# Patient Record
Sex: FEMALE | Race: WHITE | NOT HISPANIC OR LATINO | Employment: OTHER | URBAN - METROPOLITAN AREA
[De-identification: names, ages, dates, MRNs, and addresses within clinical notes are randomized per-mention and may not be internally consistent; named-entity substitution may affect disease eponyms.]

---

## 2024-07-24 ENCOUNTER — TELEPHONE (OUTPATIENT)
Dept: HEALTH INFORMATION MANAGEMENT | Facility: OTHER | Age: 64
End: 2024-07-24

## 2024-07-31 ENCOUNTER — HOSPITAL ENCOUNTER (EMERGENCY)
Facility: MEDICAL CENTER | Age: 64
End: 2024-07-31
Attending: EMERGENCY MEDICINE
Payer: COMMERCIAL

## 2024-07-31 VITALS
HEIGHT: 62 IN | DIASTOLIC BLOOD PRESSURE: 65 MMHG | RESPIRATION RATE: 16 BRPM | OXYGEN SATURATION: 92 % | HEART RATE: 69 BPM | TEMPERATURE: 97.5 F | SYSTOLIC BLOOD PRESSURE: 112 MMHG | BODY MASS INDEX: 31.1 KG/M2 | WEIGHT: 169 LBS

## 2024-07-31 DIAGNOSIS — Z72.0 TOBACCO ABUSE: ICD-10-CM

## 2024-07-31 DIAGNOSIS — F41.0 PANIC ATTACK: ICD-10-CM

## 2024-07-31 PROCEDURE — 99283 EMERGENCY DEPT VISIT LOW MDM: CPT

## 2024-07-31 PROCEDURE — 99406 BEHAV CHNG SMOKING 3-10 MIN: CPT

## 2024-07-31 PROCEDURE — 700102 HCHG RX REV CODE 250 W/ 637 OVERRIDE(OP): Performed by: EMERGENCY MEDICINE

## 2024-07-31 PROCEDURE — A9270 NON-COVERED ITEM OR SERVICE: HCPCS | Performed by: EMERGENCY MEDICINE

## 2024-07-31 RX ORDER — NICOTINE 21 MG/24HR
1 PATCH, TRANSDERMAL 24 HOURS TRANSDERMAL ONCE
Status: DISCONTINUED | OUTPATIENT
Start: 2024-07-31 | End: 2024-07-31 | Stop reason: HOSPADM

## 2024-07-31 RX ORDER — NICOTINE 21 MG/24HR
1 PATCH, TRANSDERMAL 24 HOURS TRANSDERMAL EVERY 24 HOURS
Qty: 21 PATCH | Refills: 0 | Status: SHIPPED | OUTPATIENT
Start: 2024-07-31 | End: 2024-08-21

## 2024-07-31 RX ADMIN — NICOTINE 14 MG: 14 PATCH TRANSDERMAL at 04:24

## 2024-07-31 ASSESSMENT — FIBROSIS 4 INDEX: FIB4 SCORE: 1.20688762076150991

## 2024-07-31 NOTE — ED NOTES
Bedside report received from off going RN: Jose, assumed care of patient.  POC discussed with patient. Call light within reach, all needs addressed at this time.       Fall risk interventions in place: Patient's personal possessions are with in their safe reach and Keep floor surfaces clean and dry (all applicable per [place] Fall risk assessment)   Continuous monitoring: Pulse Ox or Blood Pressure  IVF/IV medications: Not Applicable   Oxygen: Room Air  Bedside sitter: Not Applicable   Isolation: Not Applicable

## 2024-07-31 NOTE — ED PROVIDER NOTES
"ED Provider Note    CHIEF COMPLAINT  Chief Complaint   Patient presents with    Anxiety     BIB from home, reports waking up at 3am due to short of breath, pt h/o of asthma. \"I used my inhaler but it didn't help maybe I am having anxiety right now\". Pt denies pain and short of breath at this time.        EXTERNAL RECORDS REVIEWED  Patient's last encounter was an ED visit just a few days ago on July 28 she was seen for anxiety.  Numerous prior ED encounters.      Last outpatient encounter was in the internal medicine visit.  Patient was seen for lab follow-up    HPI/ROS  LIMITATION TO HISTORY   Select: : None  OUTSIDE HISTORIAN(S):  EMS no interventions in route    Violeta Baez is a 63 y.o. female who presents to the emergency department.  She is unclear, why she called the ambulance she states.  She states he gets confused about all the medications she supposed to take.  She has 3 inhalers and she was concerned that she did something inappropriate with them.  She has no complaints at this time.  When allowed to sit and think about this for a minute, she then remembers, that she would also like to continue her efforts to stop smoking and is requesting a NicoDerm patch as well as the gum which she has received here from this facility in the past.  She is unsure of the dose that she has been on.    PAST MEDICAL HISTORY   has a past medical history of Anxiety, Asthma, CHF (congestive heart failure) (HCC), Hashimoto's disease, Hypertension, Hypothyroid, Methamphetamine abuse (HCC), Psychiatric disorder, TBI (traumatic brain injury) (HCC), and Thyroid cancer (HCC).    SURGICAL HISTORY   has a past surgical history that includes thyroidectomy; lumpectomy (Right); and tendon repair (Right).    FAMILY HISTORY  Family History   Problem Relation Age of Onset    No Known Problems Mother     Lung Cancer Father     No Known Problems Son     No Known Problems Son        SOCIAL HISTORY  Social History     Tobacco Use    " "Smoking status: Every Day     Current packs/day: 1.00     Average packs/day: 1 pack/day for 35.0 years (35.0 ttl pk-yrs)     Types: Cigarettes    Smokeless tobacco: Never   Vaping Use    Vaping status: Every Day    Substances: Nicotine   Substance and Sexual Activity    Alcohol use: Yes     Alcohol/week: 0.6 oz     Types: 1 Shots of liquor per week     Comment: \"1 pint a day\"    Drug use: Yes     Frequency: 2.0 times per week     Comment: marijuana & meth    Sexual activity: Yes       CURRENT MEDICATIONS  Home Medications       Reviewed by Aaron Cash R.N. (Registered Nurse) on 07/31/24 at 0357  Med List Status: Partial     Medication Last Dose Status   albuterol 108 (90 Base) MCG/ACT Aero Soln inhalation aerosol  Active   carvedilol (COREG) 3.125 MG Tab  Active   DULoxetine (CYMBALTA) 60 MG Cap DR Particles delayed-release capsule  Active   levothyroxine (SYNTHROID) 150 MCG Tab  Active   lisinopril (PRINIVIL) 10 MG Tab  Active   lisinopril (PRINIVIL) 20 MG Tab  Active   lisinopril-hydrochlorothiazide (PRINZIDE) 20-25 MG per tablet  Active   nicotine (NICODERM) 14 MG/24HR PATCH 24 HR  Active   nicotine polacrilex ([facility] NICOTINE) 4 MG gum  Active   ondansetron (ZOFRAN ODT) 4 MG TABLET DISPERSIBLE  Active                    ALLERGIES  Allergies   Allergen Reactions    Fluoxetine      Irritability, insomnia       PHYSICAL EXAM  VITAL SIGNS: /65   Pulse 69   Temp 36.4 °C (97.5 °F)   Resp 16   Ht 1.575 m (5' 2\")   Wt 76.7 kg (169 lb)   SpO2 92%   BMI 30.91 kg/m²    Vitals reviewed.  Constitutional: Patient is oriented to person, place, and time. Appears well-developed and well-nourished. Mild distress.  A little anxious but able to be reassured.  Head: Normocephalic and atraumatic.   Eyes: Conjunctivae are normal.  Neck: Normal range of motion.   Cardiovascular: Normal rate, regular rhythm and normal heart sounds.  Pulmonary/Chest: Effort normal and breath sounds normal. No respiratory " distress  Musculoskeletal: No edema  Neurological: No cranial nerve deficits, on passive exam. Normal gait. No focal deficits.   Skin: Skin is warm and dry.   Psychiatric: Patient has a normal mood and affect.     EKG/LABS    RADIOLOGY/PROCEDURES     COURSE & MEDICAL DECISION MAKING    ASSESSMENT, COURSE AND PLAN  Care Narrative:   This is a 63-year-old female.  Brought in by ambulance.  She admits, that due to her traumatic brain injury, she gets confused about taking her medications.  She has reassuring vital signs.  She feels better now.  She is requesting assistance with smoking cessation.  Per review of prior records, prior medications, patient has been prescribed, the 4 mg nicotine gum as well as the 14 mg NicoDerm patch.  Once these are administered, I anticipate discharge to home.    ADDITIONAL PROBLEMS MANAGED    Yasmeen Davis D.O. spent greater than 3 minutes with the patient explaining the importance of smoking cessation.       DISPOSITION AND DISCUSSIONS  I have discussed management of the patient with the following physicians and JOSÉ LUIS's:  None    Discussion of management with other [facility] or appropriate source(s): None     Escalation of care considered, and ultimately not performed: None    Barriers to care at this time, including but not limited to: None.     Decision tools and prescription drugs considered including, but not limited to: None.    FINAL DIAGNOSIS  1. Tobacco abuse    2. Panic attack         Electronically signed by: Yasmeen Davis D.O., 7/31/2024 4:00 AM

## 2024-07-31 NOTE — ED NOTES
Discharge instructions reviewed with patient and signed. They were instructed on how to  prescriptions. They verbalized understanding of follow up instructions. All belongings with patient. They ambulate with a steady gait

## 2024-07-31 NOTE — ED TRIAGE NOTES
"Chief Complaint   Patient presents with    Anxiety     BIB from home, reports waking up at 3am due to short of breath, pt h/o of asthma. \"I used my inhaler but it didn't help maybe I am having anxiety right now\". Pt denies pain and short of breath at this time.      Patient denies alcohol and drug use. Patient has medication for anxiety but not compliant     Pt is alert and oriented x 4, speaking in full sentences, follows commands and responds appropriately to questions.     Respirations are even and unlabored.    /57   Pulse 61   Temp 36.4 °C (97.5 °F) (Temporal)   Resp 18   Ht 1.575 m (5' 2\")   Wt 76.7 kg (169 lb)   SpO2 95%   BMI 30.91 kg/m²     "

## 2024-08-01 ENCOUNTER — HOSPITAL ENCOUNTER (EMERGENCY)
Facility: MEDICAL CENTER | Age: 64
End: 2024-08-01
Attending: STUDENT IN AN ORGANIZED HEALTH CARE EDUCATION/TRAINING PROGRAM
Payer: COMMERCIAL

## 2024-08-01 VITALS
BODY MASS INDEX: 31.28 KG/M2 | DIASTOLIC BLOOD PRESSURE: 84 MMHG | TEMPERATURE: 97 F | OXYGEN SATURATION: 96 % | HEART RATE: 64 BPM | WEIGHT: 170 LBS | HEIGHT: 62 IN | SYSTOLIC BLOOD PRESSURE: 197 MMHG | RESPIRATION RATE: 18 BRPM

## 2024-08-01 DIAGNOSIS — F17.200 NICOTINE DEPENDENCE, UNCOMPLICATED, UNSPECIFIED NICOTINE PRODUCT TYPE: ICD-10-CM

## 2024-08-01 PROCEDURE — 700102 HCHG RX REV CODE 250 W/ 637 OVERRIDE(OP): Performed by: STUDENT IN AN ORGANIZED HEALTH CARE EDUCATION/TRAINING PROGRAM

## 2024-08-01 PROCEDURE — A9270 NON-COVERED ITEM OR SERVICE: HCPCS | Performed by: STUDENT IN AN ORGANIZED HEALTH CARE EDUCATION/TRAINING PROGRAM

## 2024-08-01 PROCEDURE — 99284 EMERGENCY DEPT VISIT MOD MDM: CPT

## 2024-08-01 RX ORDER — NICOTINE 21 MG/24HR
1 PATCH, TRANSDERMAL 24 HOURS TRANSDERMAL ONCE
Status: DISCONTINUED | OUTPATIENT
Start: 2024-08-01 | End: 2024-08-01 | Stop reason: HOSPADM

## 2024-08-01 RX ADMIN — NICOTINE 21 MG: 21 PATCH TRANSDERMAL at 16:29

## 2024-08-01 ASSESSMENT — FIBROSIS 4 INDEX: FIB4 SCORE: 1.20688762076150991
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
## 2024-08-01 NOTE — ED TRIAGE NOTES
"Chief Complaint   Patient presents with    Anxiety     Anxiety related to meth use       Pt BIBA with the above complaint.     BP (!) 180/110   Pulse 98   Temp 36.2 °C (97.1 °F)   Resp 18   Ht 1.575 m (5' 2\")   Wt 77.1 kg (170 lb)   SpO2 96%   BMI 31.09 kg/m²       "

## 2024-08-05 ENCOUNTER — OFFICE VISIT (OUTPATIENT)
Dept: BEHAVIORAL HEALTH | Facility: CLINIC | Age: 64
End: 2024-08-05
Payer: COMMERCIAL

## 2024-08-05 DIAGNOSIS — F43.10 PTSD (POST-TRAUMATIC STRESS DISORDER): ICD-10-CM

## 2024-08-05 DIAGNOSIS — F39 MOOD DISORDER (HCC): ICD-10-CM

## 2024-08-05 DIAGNOSIS — F41.1 GAD (GENERALIZED ANXIETY DISORDER): ICD-10-CM

## 2024-08-05 PROCEDURE — 90792 PSYCH DIAG EVAL W/MED SRVCS: CPT | Performed by: PSYCHIATRY & NEUROLOGY

## 2024-08-05 RX ORDER — DULOXETIN HYDROCHLORIDE 60 MG/1
60 CAPSULE, DELAYED RELEASE ORAL DAILY
Qty: 30 CAPSULE | Refills: 2 | Status: SHIPPED | OUTPATIENT
Start: 2024-08-05 | End: 2024-11-03
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
## 2024-08-09 ENCOUNTER — PATIENT OUTREACH (OUTPATIENT)
Dept: HEALTH INFORMATION MANAGEMENT | Facility: OTHER | Age: 64
End: 2024-08-09
Payer: COMMERCIAL

## 2024-08-12 ENCOUNTER — HOSPITAL ENCOUNTER (EMERGENCY)
Facility: MEDICAL CENTER | Age: 64
End: 2024-08-12
Attending: EMERGENCY MEDICINE
Payer: COMMERCIAL

## 2024-08-12 VITALS
BODY MASS INDEX: 29.44 KG/M2 | HEIGHT: 62 IN | TEMPERATURE: 98 F | OXYGEN SATURATION: 98 % | DIASTOLIC BLOOD PRESSURE: 70 MMHG | SYSTOLIC BLOOD PRESSURE: 99 MMHG | HEART RATE: 65 BPM | RESPIRATION RATE: 17 BRPM | WEIGHT: 160 LBS

## 2024-08-12 DIAGNOSIS — Z71.6 TOBACCO ABUSE COUNSELING: ICD-10-CM

## 2024-08-12 DIAGNOSIS — Z86.79 HISTORY OF HYPERTENSION: ICD-10-CM

## 2024-08-12 DIAGNOSIS — F41.1 ANXIETY REACTION: ICD-10-CM

## 2024-08-12 DIAGNOSIS — Z86.79 HISTORY OF CONGESTIVE HEART FAILURE: ICD-10-CM

## 2024-08-12 DIAGNOSIS — Z87.820 HISTORY OF TRAUMATIC BRAIN INJURY: ICD-10-CM

## 2024-08-12 DIAGNOSIS — Z72.0 TOBACCO ABUSE: ICD-10-CM

## 2024-08-12 PROCEDURE — 99283 EMERGENCY DEPT VISIT LOW MDM: CPT

## 2024-08-12 ASSESSMENT — FIBROSIS 4 INDEX: FIB4 SCORE: 1.20688762076150991

## 2024-08-12 NOTE — ED TRIAGE NOTES
"Chief Complaint   Patient presents with    Other     Pt. BIB EMS for c/o \"I am just really concerned because I wasn't supposed to smoke a cigarette.  I am trying to quit and I have a nicotine patch on and I smoked a cigarette.  I just need to get checked out.\"  Denies any specific complaints.  \"I think that I may have just panicked because I smoked a cigarette and I have asthma so then I did my inhaler and then I panicked.\"      To triage via w/c.  Pt. Speaking in rapid full sentences.  No s/s of acute distress noted.    "

## 2024-08-13 NOTE — ED NOTES
Bedside report received from off going RN: Caryl. Assumed care of patient.  POC discussed with patient. Call light within reach, all needs addressed at this time.       Fall risk interventions in place: Move the patient closer to the nurse's station, Patient's personal possessions are with in their safe reach, Place socks on patient, Keep floor surfaces clean and dry, and Accompanied to restroom   Continuous monitoring: Not Applicable   IVF/IV medications: Not Applicable   Oxygen: Room Air  Bedside sitter: Not Applicable   Isolation: Not Applicable
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
## 2024-08-13 NOTE — DISCHARGE INSTRUCTIONS
Do not use your inhalers any more than 2 puffs every 6 hours and only as needed for wheezing or difficulty breathing.  Cut back on your cigarettes slowly, if you have been smoking 1 pack/day cut down to 1/2 pack/day for a week and then 1/4 pack/day for a week and then stop.  Only take the lisinopril and the carvedilol, stop taking the lisinopril with the hydrochlorothiazide.  Make sure you are taking your Cymbalta and your Synthroid.  Follow-up with your doctor in 1 week for recheck

## 2024-08-15 NOTE — PROGRESS NOTES
Community Health Worker Note    Discussed: CHEMEKA Colin reached out to pt to complete monthly outreach. Pt had difficulty following conversation, repeating same questions and statements numerous times. Pt stated she missed her appt with PCP today 8/15. Pt expressed worry that Dr. Mtz will no longer see her. This CHW told pt she was unsure at this time what the plan is but educated pt how important it is to go to appts and if unable to make it, to cancel instead of no show. This CHW highly encouraged pt to go to [facility] to establish with primary care so she can get all her needs met since it is close to where she stays most the time. CHW had to repeat myself multiple times, pt then seemingly expressed understanding. Pt ended call.     Follow-Up Needs: None at this time.    Plan: CHW closing GCM due to no progress.

## 2024-08-16 DIAGNOSIS — J45.20 MILD INTERMITTENT ASTHMA WITHOUT COMPLICATION: ICD-10-CM

## 2024-08-17 ENCOUNTER — HOSPITAL ENCOUNTER (EMERGENCY)
Facility: MEDICAL CENTER | Age: 64
End: 2024-08-17
Attending: STUDENT IN AN ORGANIZED HEALTH CARE EDUCATION/TRAINING PROGRAM
Payer: COMMERCIAL

## 2024-08-17 ENCOUNTER — HOSPITAL ENCOUNTER (EMERGENCY)
Facility: MEDICAL CENTER | Age: 64
End: 2024-08-18
Attending: EMERGENCY MEDICINE
Payer: COMMERCIAL

## 2024-08-17 ENCOUNTER — HOSPITAL ENCOUNTER (EMERGENCY)
Facility: MEDICAL CENTER | Age: 64
End: 2024-08-17
Attending: EMERGENCY MEDICINE
Payer: COMMERCIAL

## 2024-08-17 VITALS
HEIGHT: 62 IN | RESPIRATION RATE: 15 BRPM | SYSTOLIC BLOOD PRESSURE: 129 MMHG | HEART RATE: 60 BPM | OXYGEN SATURATION: 98 % | TEMPERATURE: 98.3 F | BODY MASS INDEX: 29.44 KG/M2 | DIASTOLIC BLOOD PRESSURE: 64 MMHG | WEIGHT: 160 LBS

## 2024-08-17 VITALS
BODY MASS INDEX: 29.44 KG/M2 | HEIGHT: 62 IN | DIASTOLIC BLOOD PRESSURE: 75 MMHG | RESPIRATION RATE: 20 BRPM | TEMPERATURE: 96.8 F | HEART RATE: 62 BPM | SYSTOLIC BLOOD PRESSURE: 122 MMHG | OXYGEN SATURATION: 96 % | WEIGHT: 160 LBS

## 2024-08-17 DIAGNOSIS — F15.10 METHAMPHETAMINE ABUSE (HCC): ICD-10-CM

## 2024-08-17 DIAGNOSIS — F41.9 ANXIETY: ICD-10-CM

## 2024-08-17 DIAGNOSIS — Z87.820 HISTORY OF TRAUMATIC BRAIN INJURY: ICD-10-CM

## 2024-08-17 LAB
AMPHET UR QL SCN: NEGATIVE
BARBITURATES UR QL SCN: NEGATIVE
BENZODIAZ UR QL SCN: POSITIVE
BZE UR QL SCN: NEGATIVE
CANNABINOIDS UR QL SCN: NEGATIVE
FENTANYL UR QL: NEGATIVE
METHADONE UR QL SCN: NEGATIVE
OPIATES UR QL SCN: NEGATIVE
OXYCODONE UR QL SCN: NEGATIVE
PCP UR QL SCN: NEGATIVE
POC BREATHALIZER: 0 PERCENT (ref 0–0.01)
PROPOXYPH UR QL SCN: NEGATIVE

## 2024-08-17 PROCEDURE — 99284 EMERGENCY DEPT VISIT MOD MDM: CPT

## 2024-08-17 PROCEDURE — 700102 HCHG RX REV CODE 250 W/ 637 OVERRIDE(OP): Performed by: EMERGENCY MEDICINE

## 2024-08-17 PROCEDURE — 700102 HCHG RX REV CODE 250 W/ 637 OVERRIDE(OP): Performed by: STUDENT IN AN ORGANIZED HEALTH CARE EDUCATION/TRAINING PROGRAM

## 2024-08-17 PROCEDURE — A9270 NON-COVERED ITEM OR SERVICE: HCPCS | Performed by: EMERGENCY MEDICINE

## 2024-08-17 PROCEDURE — 80307 DRUG TEST PRSMV CHEM ANLYZR: CPT

## 2024-08-17 PROCEDURE — 302970 POC BREATHALIZER: Performed by: STUDENT IN AN ORGANIZED HEALTH CARE EDUCATION/TRAINING PROGRAM

## 2024-08-17 PROCEDURE — A9270 NON-COVERED ITEM OR SERVICE: HCPCS | Performed by: STUDENT IN AN ORGANIZED HEALTH CARE EDUCATION/TRAINING PROGRAM

## 2024-08-17 RX ORDER — HALOPERIDOL 5 MG/1
5 TABLET ORAL ONCE
Status: COMPLETED | OUTPATIENT
Start: 2024-08-17 | End: 2024-08-17

## 2024-08-17 RX ORDER — HYDROXYZINE HYDROCHLORIDE 25 MG/1
25 TABLET, FILM COATED ORAL ONCE
Status: COMPLETED | OUTPATIENT
Start: 2024-08-17 | End: 2024-08-17

## 2024-08-17 RX ORDER — LORAZEPAM 1 MG/1
1 TABLET ORAL ONCE
Status: COMPLETED | OUTPATIENT
Start: 2024-08-17 | End: 2024-08-17

## 2024-08-17 RX ADMIN — LORAZEPAM 1 MG: 1 TABLET ORAL at 23:10

## 2024-08-17 RX ADMIN — HALOPERIDOL 5 MG: 5 TABLET ORAL at 19:08

## 2024-08-17 RX ADMIN — HYDROXYZINE HYDROCHLORIDE 25 MG: 25 TABLET, FILM COATED ORAL at 19:08

## 2024-08-17 ASSESSMENT — LIFESTYLE VARIABLES
TOTAL SCORE: 0
HAVE PEOPLE ANNOYED YOU BY CRITICIZING YOUR DRINKING: NO
CONSUMPTION TOTAL: INCOMPLETE
HAVE YOU EVER FELT YOU SHOULD CUT DOWN ON YOUR DRINKING: NO
TOTAL SCORE: 0
EVER FELT BAD OR GUILTY ABOUT YOUR DRINKING: NO
TOTAL SCORE: 0
EVER HAD A DRINK FIRST THING IN THE MORNING TO STEADY YOUR NERVES TO GET RID OF A HANGOVER: NO
DO YOU DRINK ALCOHOL: NO

## 2024-08-17 ASSESSMENT — FIBROSIS 4 INDEX
FIB4 SCORE: 1.20688762076150991

## 2024-08-17 NOTE — ED TRIAGE NOTES
"Chief Complaint   Patient presents with    Anxiety     [facility] for the above complaint. Patient rambling, paranoid, and extremely anxious on arrival and uncooperative with triage. Per EMS, patient has been non compliant with her anxiety medication recently and has noticed an increase in her paranoia.    Patient has a history of methamphetamine abuse and smoking but states that she recently quit smoking and is using nicotine patches and gum.    Patient received 2 mg IM Versed by EMS PTA, no other interventions.    /61   Pulse (!) 58   Temp 36.8 °C (98.2 °F) (Temporal)   Resp 16   Ht 1.575 m (5' 2\")   Wt 72.6 kg (160 lb)   SpO2 92%  - RA  "
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
## 2024-08-17 NOTE — ED NOTES
Bedside report received from off going RN/tech: Elle, assumed care of patient.  POC discussed with patient. Call light within reach, all needs addressed at this time.       Fall risk interventions in place: Not Applicable (all applicable per [facility] Fall risk assessment)   Continuous monitoring: Not Applicable   IVF/IV medications: Not Applicable   Oxygen: Room Air  Bedside sitter: Not Applicable   Isolation: Not Applicable

## 2024-08-18 VITALS
OXYGEN SATURATION: 92 % | SYSTOLIC BLOOD PRESSURE: 126 MMHG | TEMPERATURE: 98.2 F | WEIGHT: 160 LBS | BODY MASS INDEX: 29.44 KG/M2 | HEIGHT: 62 IN | DIASTOLIC BLOOD PRESSURE: 60 MMHG | RESPIRATION RATE: 18 BRPM | HEART RATE: 65 BPM

## 2024-08-18 NOTE — ED NOTES
Poc breathalyzer 0.000  Assisted pt to restroom, urine sample obtained and sent to lab  Informed Alert team order for consult.

## 2024-08-18 NOTE — ED NOTES
Lab called they have pt's urine but are currently not able to run the urine at this time. Dr Mckeon aware. Lab will run test when able and post it to the chart. Okayed to dc patient per Dr Mckeon. Vs stable. Pt is taking a taxi home. Pt is A&O x4. Pt reports she feels less anxious. Pt ready for dc.

## 2024-08-18 NOTE — DISCHARGE PLANNING
"ALERT team  note:  63 year old female BIB EMS today with c/o anxiety, which is a noted chronic complaint of pt's, including chronic Methamphetamine and ETOH use; voluntary pt; pt with chronic use of ED services for non-emergent complaints, including today with previous [facility] ED visit earlier today for same complaint of \"anxiety\" and received Versed 2 mg IV by ALBERTO, then Dc'd to self; currently, EtOH negative and UDS pending collection; pt denies recent use of either, which is also chronic for pt; states she has an outpt psychiatrist who prescribes her Cymbalta CR 60 mg PO daily, she cannot remember her psychiatrist's name; no SI, HI, or self-harm ideation noted; she is currently residing at the [facility] and able to meet her basic needs; Writer RN reviewed community CD and MH resources with pt, with written information given, including AA mtgs, [facility], [facility], [facility], [facility], and sober living ([facility], [facility]); pt verbalized understanding; writer RN updated [facility] ERP, Dr. Mckeon; pt to DC to self today after receiving Haldol 5 mg PO and Hydroxyzine 25 mg PO today    [employer] commercial insurance plan  "

## 2024-08-18 NOTE — ED NOTES
Assumed care of patient, patient bedside report received from SIDNEY Faria . Pt AAO X 4 , respirations even and unlabored, on room air . Introduced self as pt RN, POC discussed, call light in reach, Fall risk interventions in place.

## 2024-08-18 NOTE — ED TRIAGE NOTES
Chief Complaint   Patient presents with    Anxiety     Pt BIB [facility] from home for anxiety. Pt is anxious, pt seen earlier for the same. Pt denies SI, HI. Pt was given 5 mg of IM haldol per EMS.

## 2024-08-18 NOTE — ED NOTES
Pt discharged . GCS 15. pt in possession of belongings. Pt provided discharge education and information pertaining to medications and follow up appointments. Pt received copy of discharge instructions and verbalized understanding.     Vitals:    08/18/24 0100   BP: 126/60   Pulse: 65   Resp: 18   Temp: 36.8 °C (98.2 °F)   SpO2: 92%

## 2024-08-18 NOTE — DISCHARGE INSTRUCTIONS
You need to make sure you are taking your medication daily, use your pill organizer.  Return if any problems or worsening

## 2024-08-18 NOTE — ED PROVIDER NOTES
"ED Provider Note    CHIEF COMPLAINT  Chief Complaint   Patient presents with    Anxiety       EXTERNAL RECORDS REVIEWED  Outpatient Notes psychiatry note 8/5/2024 for mood disorder, PTSD, EDUARDO started on duloxetine 60 mg daily    HPI/ROS  LIMITATION TO HISTORY   Select: Behavior  OUTSIDE HISTORIAN(S):  none    Violeta Baez is a 63 y.o. female who presents by EMS apparently for anxiety.  Patient keeps repeating \"I meant to call [phone]\".  She reports that she thinks she missed an appointment.  She cannot tell me what appointment she thinks she missed.  She tells me her doctor is Dr. Lomax.  She does not know when the appointment was that she thinks she missed.  She states that she was informed she is supposed to call [phone] rather than 911 when she is feeling anxious.  I later found out that patient evidently called EMS 5 times today.  They attempted to help her with cooking a meal at her place of living but she continued to be insistent on transport.  She has no acute pain complaints at this time.  She was seen earlier today for a somewhat similar complaint.  She denies fever, denies chest pain, denies abdominal pain.  She reports she recently quit smoking and is feeling poorly from nicotine withdrawal.  Has a history of substance abuse.  He denies SI or HI.  She denies auditory visual donations.    PAST MEDICAL HISTORY   has a past medical history of Anxiety, Asthma, CHF (congestive heart failure) (HCC), Hashimoto's disease, Hypertension, Hypothyroid, Methamphetamine abuse (HCC), Psychiatric disorder, TBI (traumatic brain injury) (HCC), and Thyroid cancer (HCC).    SURGICAL HISTORY   has a past surgical history that includes thyroidectomy; lumpectomy (Right); and tendon repair (Right).    FAMILY HISTORY  Family History   Problem Relation Age of Onset    No Known Problems Mother     Lung Cancer Father     No Known Problems Son     No Known Problems Son        SOCIAL HISTORY  Social History     Tobacco Use    " "Smoking status: Every Day     Current packs/day: 1.00     Average packs/day: 1 pack/day for 35.0 years (35.0 ttl pk-yrs)     Types: Cigarettes    Smokeless tobacco: Never   Vaping Use    Vaping status: Every Day    Substances: Nicotine   Substance and Sexual Activity    Alcohol use: Yes     Alcohol/week: 0.6 oz     Types: 1 Shots of liquor per week     Comment: \"1 pint a day\"    Drug use: Not Currently     Frequency: 2.0 times per week     Comment: marijuana & meth\"I don't do them anymore\"    Sexual activity: Yes       CURRENT MEDICATIONS  Home Medications    **Home medications have not yet been reviewed for this encounter**         ALLERGIES  Allergies   Allergen Reactions    Fluoxetine      Irritability, insomnia       PHYSICAL EXAM  VITAL SIGNS: /75   Pulse 62   Temp 36 °C (96.8 °F) (Temporal)   Resp 20   Ht 1.575 m (5' 2\")   Wt 72.6 kg (160 lb)   SpO2 96%   BMI 29.26 kg/m²    Constitutional: Awake and alert. No acute distress.  Head: NCAT.  HEENT: Normal Conjunctiva. PERRLA.  Neck: Grossly normal range of motion. Airway midline.  Cardiovascular: Normal heart rate, Normal rhythm.  Thorax & Lungs: No respiratory distress. Clear to Auscultation bilaterally.  Abdomen: Normal inspection. Nontender. Nondistended  Skin: No obvious rash.  Back: No tenderness, No CVA tenderness.   Musculoskeletal: No obvious deformity. Moves all extremities Well.  Neurologic: A&Ox3.   Psychiatric: Anxious and circular conversation. Denies SI/HI. Denies AH/VH. Appears reasonably groomed and able to care for herself.    EKG/LABS  Results for orders placed or performed during the hospital encounter of 08/17/24   POC BREATHALIZER   Result Value Ref Range    POC Breathalizer 0.000 0.00 - 0.01 Percent     COURSE & MEDICAL DECISION MAKING    ASSESSMENT, COURSE AND PLAN  Care Narrative:   63-year-old female history of anxiety, substance use disorder in the past here for anxiety and reporting she \"meant to call [phone]\"  Afebrile normal " vitals  On exam she is anxious but in no distress.  She denies SI or HI.  Denies auditory visual hallucinations.  She overall seems dressed appropriately and can care for herself.  She was evaluated earlier today with suspicion of methamphetamine use and she was somnolent.  She seems more alert when reading the documentation from earlier today.  She is not voicing any acute medical complaints.  Given that she is not reporting any complaint or request of treatments, I asked if she has any acute concerns she would like addressed and she declines.  In chart review her psychiatrist is encouraging her to call [phone] as she apparently utilizes the 911 system frequently.  Will have the alert team discussed with her and reiterated the importance of her follow-up appointments.  Medications ordered for anxiety  Alert team assessed patient and provided resources.  Did not feel patient met inpatient criteria or had an acute psychiatric problem.  Patient is discharged to prior living conditions.      ADDITIONAL PROBLEMS MANAGED  none    DISPOSITION AND DISCUSSIONS  I have discussed management of the patient with the following physicians and JOSÉ LUIS's:  none    Discussion of management with other QHP or appropriate source(s): None     Escalation of care considered, and ultimately not performed:Laboratory analysis and acute inpatient care management, however at this time, the patient is most appropriate for outpatient management    Barriers to care at this time, including but not limited to: Patient does not have established PCP, Patient lacks transportation , and Patient lacks financial resources.     Decision tools and prescription drugs considered including, but not limited to:  none .    FINAL DIAGNOSIS  1. Anxiety         Electronically signed by: Eusebio Mckeon D.O., 8/17/2024 6:16 PM

## 2024-08-18 NOTE — ED TRIAGE NOTES
Chief Complaint   Patient presents with    Anxiety   Pt bib ems from [facility] c/o anxiety per report pt was recently d/c here similar complaint.

## 2024-08-19 ENCOUNTER — HOSPITAL ENCOUNTER (EMERGENCY)
Facility: MEDICAL CENTER | Age: 64
End: 2024-08-19
Attending: EMERGENCY MEDICINE
Payer: COMMERCIAL

## 2024-08-19 VITALS
OXYGEN SATURATION: 92 % | SYSTOLIC BLOOD PRESSURE: 137 MMHG | HEART RATE: 77 BPM | BODY MASS INDEX: 29.44 KG/M2 | WEIGHT: 160 LBS | RESPIRATION RATE: 16 BRPM | HEIGHT: 62 IN | DIASTOLIC BLOOD PRESSURE: 87 MMHG | TEMPERATURE: 97.5 F

## 2024-08-19 DIAGNOSIS — F41.9 ANXIETY: ICD-10-CM

## 2024-08-19 PROCEDURE — 99283 EMERGENCY DEPT VISIT LOW MDM: CPT

## 2024-08-19 ASSESSMENT — FIBROSIS 4 INDEX: FIB4 SCORE: 1.20688762076150991

## 2024-08-19 NOTE — ED TRIAGE NOTES
"Chief Complaint   Patient presents with    Anxiety     BIB EMS from home, pt reports anxiety and \"feeling like I am dying\".      /89   Pulse 76   Temp 36.3 °C (97.4 °F) (Temporal)   Resp 18   Ht 1.575 m (5' 2\")   Wt 72.6 kg (160 lb)   SpO2 91%   BMI 29.26 kg/m²     Pt was seen 3 times on 8/17 for anxiety. Chart up for ERP.   "

## 2024-08-19 NOTE — ED PROVIDER NOTES
"ED Provider Note    CHIEF COMPLAINT  Chief Complaint   Patient presents with    Anxiety     BIB EMS from home, pt reports anxiety and \"feeling like I am dying\".            HPI/ROS  LIMITATION TO HISTORY   Select: : None  OUTSIDE HISTORIAN(S):  EMS ambulance medics gave report of the patient condition on scene, condition and route and chief complaint    Violeta Baez is a 63 y.o. female who presents feeling anxious.  She admits to history of meth use but states she has not used it today.  Seen 3 times  2 days ago for anxiety, she returned today stating she felt anxious again.  She denies suicidal ideation.  Initially reported to triage that \"feeling like I am dying\", she denies this at this time.  No chest pain.  No difficulty breathing.  No abdominal pain.  She denies headache.  Review of chart shows frequent visits to the ER for anxiety.  The patient denies new injury    PAST MEDICAL HISTORY   has a past medical history of Anxiety, Asthma, CHF (congestive heart failure) (HCC), Hashimoto's disease, Hypertension, Hypothyroid, Methamphetamine abuse (HCC), Psychiatric disorder, TBI (traumatic brain injury) (HCC), and Thyroid cancer (HCC).    SURGICAL HISTORY   has a past surgical history that includes thyroidectomy; lumpectomy (Right); and tendon repair (Right).    FAMILY HISTORY  Family History   Problem Relation Age of Onset    No Known Problems Mother     Lung Cancer Father     No Known Problems Son     No Known Problems Son        SOCIAL HISTORY  Social History     Tobacco Use    Smoking status: Every Day     Current packs/day: 1.00     Average packs/day: 1 pack/day for 35.0 years (35.0 ttl pk-yrs)     Types: Cigarettes    Smokeless tobacco: Never   Vaping Use    Vaping status: Every Day    Substances: Nicotine   Substance and Sexual Activity    Alcohol use: Yes     Alcohol/week: 0.6 oz     Types: 1 Shots of liquor per week     Comment: \"1 pint a day\"    Drug use: Not Currently     Frequency: 2.0 times per " "week     Comment: marijuana & meth\"I don't do them anymore\"    Sexual activity: Yes       CURRENT MEDICATIONS  Home Medications       Reviewed by Kristy Hamilton R.N. (Registered Nurse) on 08/19/24 at 1520  Med List Status: Partial     Medication Last Dose Status   albuterol 108 (90 Base) MCG/ACT Aero Soln inhalation aerosol  Active   carvedilol (COREG) 3.125 MG Tab  Active   DULoxetine (CYMBALTA) 60 MG Cap DR Particles delayed-release capsule  Active   levothyroxine (SYNTHROID) 150 MCG Tab  Active   lisinopril (PRINIVIL) 10 MG Tab  Active   lisinopril (PRINIVIL) 20 MG Tab  Active   lisinopril-hydrochlorothiazide (PRINZIDE) 20-25 MG per tablet  Active   nicotine (NICODERM) 14 MG/24HR PATCH 24 HR  Active   nicotine polacrilex ([employer] NICOTINE) 4 MG gum  Active   nicotine polacrilex (NICORETTE) 4 MG gum  Active   ondansetron (ZOFRAN ODT) 4 MG TABLET DISPERSIBLE  Active                    ALLERGIES  Allergies   Allergen Reactions    Fluoxetine      Irritability, insomnia       PHYSICAL EXAM  VITAL SIGNS: /89   Pulse 76   Temp 36.3 °C (97.4 °F) (Temporal)   Resp 18   Ht 1.575 m (5' 2\")   Wt 72.6 kg (160 lb)   SpO2 91%   BMI 29.26 kg/m²    Constitutional: Well-nourished no acute distress  Respiratory: Clear lung sounds  Cardiac: Regular rate, regular rhythm  Psychiatric: Mildly anxious.  Cooperative.  Patient denies suicidal or homicidal ideation  Neurologic: Ambulates without assistance.  No ataxia.  Strength normal, speech clear  GI: Abdomen is soft and nontender          COURSE & MEDICAL DECISION MAKING    ASSESSMENT, COURSE AND PLAN  Care Narrative: Patient here initially for anxiety.  Review of her chart shows highly frequent visits to the ER, 2 days ago had 3 visits in the same day.  Her anxiety seems to have calmed.  She is currently perseverating on telling the nurse she does not use methamphetamine anymore.  With normal vital signs, no evidence of sympathomimetic toxidrome at this time.  When asked the " patient how I could help her, she states at this point she feels well and would like to go home.  Patient denies being at risk to herself or others.  Given no other acute complaints, she is discharged in stable condition without further workup.        DISPOSITION AND DISCUSSIONS    Escalation of care considered, and ultimately not performed:Laboratory analysis    Barriers to care at this time, including but not limited to: Patient does not have established PCP.       FINAL DIAGNOSIS  1. Anxiety         Electronically signed by: Andrew Cruz M.D., 8/19/2024 3:33 PM

## 2024-08-20 ENCOUNTER — HOSPITAL ENCOUNTER (OUTPATIENT)
Dept: PULMONOLOGY | Facility: MEDICAL CENTER | Age: 64
End: 2024-08-20
Attending: STUDENT IN AN ORGANIZED HEALTH CARE EDUCATION/TRAINING PROGRAM
Payer: COMMERCIAL

## 2024-08-20 DIAGNOSIS — Z72.0 TOBACCO ABUSE: ICD-10-CM

## 2024-08-20 DIAGNOSIS — J45.20 MILD INTERMITTENT ASTHMA WITHOUT COMPLICATION: ICD-10-CM

## 2024-08-20 PROCEDURE — 94060 EVALUATION OF WHEEZING: CPT

## 2024-08-20 PROCEDURE — 94726 PLETHYSMOGRAPHY LUNG VOLUMES: CPT | Mod: 26 | Performed by: INTERNAL MEDICINE

## 2024-08-20 PROCEDURE — 94729 DIFFUSING CAPACITY: CPT

## 2024-08-20 PROCEDURE — 94060 EVALUATION OF WHEEZING: CPT | Mod: 26 | Performed by: INTERNAL MEDICINE

## 2024-08-20 PROCEDURE — 94729 DIFFUSING CAPACITY: CPT | Mod: 26 | Performed by: INTERNAL MEDICINE

## 2024-08-20 PROCEDURE — 94726 PLETHYSMOGRAPHY LUNG VOLUMES: CPT

## 2024-08-20 RX ORDER — ALBUTEROL SULFATE 90 UG/1
2 AEROSOL, METERED RESPIRATORY (INHALATION) EVERY 6 HOURS PRN
Qty: 8.5 G | Refills: 0 | Status: SHIPPED | OUTPATIENT
Start: 2024-08-20

## 2024-08-20 RX ORDER — ALBUTEROL SULFATE 5 MG/ML
2.5 SOLUTION RESPIRATORY (INHALATION)
Status: DISCONTINUED | OUTPATIENT
Start: 2024-08-20 | End: 2024-08-21 | Stop reason: HOSPADM

## 2024-08-20 RX ADMIN — ALBUTEROL SULFATE 2.5 MG: 5 SOLUTION RESPIRATORY (INHALATION) at 08:58

## 2024-08-21 NOTE — PROCEDURES
DATE OF SERVICE:  08/20/2024     PULMONARY FUNCTION TEST INTERPRETATION     REQUESTING PROVIDER:  Andrei Mtz MD     REASON FOR REQUEST:  Tobacco use, mild intermittent asthma.     INTERPRETATION:  1.  Acceptable and reproducible.  2.  FEV1 1.81 liters (81%), FVC 2.57 liters (90%), ratio 70%.  3.  Flow volume loops concavity of the expiratory loop suggestive of   peripheral air obstruction.  4.  TLC 5.19 liters (109%).  5.  DLCO corrected for hemoglobin 23.26 mL per minute mmHg (125%).     IMPRESSION:  Normal spirometry.  No response to bronchodilator.  Evidence of   air trapping with normal total lung capacity and elevated gas transfer   suggestive of reactive airway disease, but clinically correlate.        ______________________________  MD HEIDY Sheridan/KAY    DD:  08/21/2024 14:19  DT:  08/21/2024 14:37    Job#:  [id number]    CC:Andrei Mtz MD

## 2024-08-22 ENCOUNTER — OFFICE VISIT (OUTPATIENT)
Dept: MEDICAL GROUP | Facility: PHYSICIAN GROUP | Age: 64
End: 2024-08-22
Payer: COMMERCIAL

## 2024-08-22 VITALS
OXYGEN SATURATION: 94 % | HEIGHT: 62 IN | HEART RATE: 65 BPM | SYSTOLIC BLOOD PRESSURE: 120 MMHG | WEIGHT: 164.8 LBS | BODY MASS INDEX: 30.33 KG/M2 | DIASTOLIC BLOOD PRESSURE: 60 MMHG

## 2024-08-22 DIAGNOSIS — F15.11 METHAMPHETAMINE ABUSE IN REMISSION (HCC): ICD-10-CM

## 2024-08-22 DIAGNOSIS — E03.8 HYPOTHYROIDISM DUE TO HASHIMOTO'S THYROIDITIS: ICD-10-CM

## 2024-08-22 DIAGNOSIS — F10.10 ALCOHOL ABUSE: ICD-10-CM

## 2024-08-22 DIAGNOSIS — Z11.3 ROUTINE SCREENING FOR STI (SEXUALLY TRANSMITTED INFECTION): ICD-10-CM

## 2024-08-22 DIAGNOSIS — Z00.00 HEALTHCARE MAINTENANCE: ICD-10-CM

## 2024-08-22 DIAGNOSIS — E03.8 OTHER SPECIFIED HYPOTHYROIDISM: ICD-10-CM

## 2024-08-22 DIAGNOSIS — Z12.4 SCREENING FOR CERVICAL CANCER: ICD-10-CM

## 2024-08-22 DIAGNOSIS — E89.0 POSTOPERATIVE HYPOTHYROIDISM: ICD-10-CM

## 2024-08-22 DIAGNOSIS — I10 PRIMARY HYPERTENSION: ICD-10-CM

## 2024-08-22 DIAGNOSIS — R73.03 PRE-DIABETES: ICD-10-CM

## 2024-08-22 DIAGNOSIS — Z72.0 TOBACCO ABUSE: ICD-10-CM

## 2024-08-22 DIAGNOSIS — F43.10 PTSD (POST-TRAUMATIC STRESS DISORDER): ICD-10-CM

## 2024-08-22 DIAGNOSIS — E06.3 HYPOTHYROIDISM DUE TO HASHIMOTO'S THYROIDITIS: ICD-10-CM

## 2024-08-22 DIAGNOSIS — Z23 NEED FOR VACCINATION: ICD-10-CM

## 2024-08-22 DIAGNOSIS — S06.9X9S TRAUMATIC BRAIN INJURY WITH LOSS OF CONSCIOUSNESS, SEQUELA (HCC): ICD-10-CM

## 2024-08-22 PROCEDURE — 90471 IMMUNIZATION ADMIN: CPT | Performed by: FAMILY MEDICINE

## 2024-08-22 PROCEDURE — 3074F SYST BP LT 130 MM HG: CPT | Performed by: FAMILY MEDICINE

## 2024-08-22 PROCEDURE — 99214 OFFICE O/P EST MOD 30 MIN: CPT | Mod: 25 | Performed by: FAMILY MEDICINE

## 2024-08-22 PROCEDURE — 90632 HEPA VACCINE ADULT IM: CPT | Performed by: FAMILY MEDICINE

## 2024-08-22 PROCEDURE — 3078F DIAST BP <80 MM HG: CPT | Performed by: FAMILY MEDICINE

## 2024-08-22 ASSESSMENT — ENCOUNTER SYMPTOMS
ABDOMINAL PAIN: 0
PALPITATIONS: 0
CHILLS: 0
SHORTNESS OF BREATH: 0
SORE THROAT: 0
FEVER: 0
NAUSEA: 0
COUGH: 0
VOMITING: 0

## 2024-08-22 ASSESSMENT — FIBROSIS 4 INDEX: FIB4 SCORE: 1.20688762076150991

## 2024-08-22 NOTE — PROGRESS NOTES
"Verbal consent was acquired by the patient to use [employer] ambient listening note generation during this visit.     Subjective:     HPI:   History of Present Illness  The patient is a 63-year-old female who presents today to [facility].    She has a history of hypertension, asthma, traumatic brain injury, PTSD, anxiety, nicotine dependence, mood disorder, hypothyroidism, generalized anxiety disorder (EDUARDO), alcohol abuse, obesity, and prediabetes. She is currently on medications including Cymbalta, lisinopril, hydrochlorothiazide, Coreg, Synthroid.    She reports feeling anxious without suicidal ideation, but continually states, \"I feel like I am dying.\" She follows with behavioral health and has been seen recently. She is currently on duloxetine 60 mg without significant side effects. She was recently referred for psychotherapy and has tried it but is unable to participate in group therapy. She does not endorse any suicidal or homicidal ideation.    She has a history of drug abuse, including methamphetamine use, but reports she is not currently using. She has previously attended AA meetings but has started drinking again. She has been trying to quit smoking.    She reports that she is retired and recently moved from [place] due to the high cost of living. She is staying at the [facility] while in [place]. She also mentions that her son has a diagnosis of schizophrenia.    Patient presents today to discuss her medications in detail as she gets easily confused.  Patient is currently on lisinopril hydrochlorothiazide combined pill as well as Synthroid Coreg and Cymbalta.    Patient's biggest concerns are panic attacks and anxiety but does not really appreciate her current psychiatrist.  Patient was recently referred to psychology and does plan to follow-up with them for coping mechanisms.      Patient is due for screening for cervical cancer.  Patient would like to have gynecology referral.    Patient is " "accompanied by a friend who is very helpful.    Review of Systems   Constitutional:  Negative for chills and fever.   HENT:  Negative for congestion and sore throat.    Respiratory:  Negative for cough and shortness of breath.    Cardiovascular:  Negative for chest pain and palpitations.   Gastrointestinal:  Negative for abdominal pain, nausea and vomiting.       Health Maintenance: Completed    Objective:     Exam:  /60 (BP Location: Right arm, Patient Position: Sitting, BP Cuff Size: Adult)   Pulse 65   Ht 1.575 m (5' 2\")   Wt 74.8 kg (164 lb 12.8 oz)   SpO2 94%   BMI 30.14 kg/m²  Body mass index is 30.14 kg/m².    Physical Exam  Vitals reviewed.   Constitutional:       Appearance: Normal appearance.   HENT:      Head: Normocephalic and atraumatic.      Right Ear: External ear normal.      Left Ear: External ear normal.      Nose: Nose normal.   Cardiovascular:      Rate and Rhythm: Normal rate and regular rhythm.      Pulses: Normal pulses.      Heart sounds: Normal heart sounds. No murmur heard.  Pulmonary:      Effort: Pulmonary effort is normal. No respiratory distress.      Breath sounds: Normal breath sounds. No wheezing.   Abdominal:      General: Abdomen is flat.      Palpations: Abdomen is soft.   Skin:     General: Skin is warm and dry.   Neurological:      Mental Status: She is alert and oriented to person, place, and time.   Psychiatric:         Mood and Affect: Mood normal.         Behavior: Behavior normal.     Poor dentition    Difficult to focus  Pressured speech  Increased speed of speech        Results      Assessment & Plan:     1. Hypothyroidism due to Hashimoto's thyroiditis  TSH    TRIIDOTHYRONINE    FREE THYROXINE      2. Screening for cervical cancer  Referral to Gynecology      3. Need for vaccination  Hep A Adult 19+      4. Healthcare maintenance  Referral to Dentistry      5. Pre-diabetes        6. Alcohol abuse        7. Other specified hypothyroidism        8. Tobacco abuse "        9. Methamphetamine abuse in remission (HCC)        10. PTSD (post-traumatic stress disorder)        11. Traumatic brain injury with loss of consciousness, sequela (HCC)        12. Primary hypertension        13. Postoperative hypothyroidism        14. Routine screening for STI (sexually transmitted infection)  HIV AG/AB COMBO ASSAY SCREENING          Assessment & Plan  Establish Care  Annual labs are up-to-date except for recent thyroid studies including TSH T4 and T3 is she is on a high dose of Synthroid which has not been stable for a while.  Most recent lab values were high normal.    2. Hypertension  Recommended continued lisinopril-hydrochlorothiazide 20-25 mg/day  Follow-up in 3 months for recheck    3. Unknown cardiac dx  Patient has upcoming appointment with cardiology on 9-5-2024.  Patient has had significant cardiac workup as she has presented to the ER multiple times.  Patient is currently on Coreg 3.125 twice daily  Heart rate controlled at 65  Echocardiogram from 4/5/2023 demonstrates an ejection fraction of 65% with mild mitral regurgitation, mild tricuspid regurgitation, right ventricular since systolic pressure of 40 mm of hemoglobin    4. Hypothyroidism  TSH T4 and T3 pending  Continue Synthroid 150 mcg every morning    5. Anxiety/panic attacks  Continue to follow with behavioral health psychiatry and psychology  Continue Cymbalta 60 mg daily    6. Alcohol Abuse.  She was advised that no amount of alcohol is safe for her due to her history of alcohol abuse. She was encouraged to continue attending AA meetings and follow up with her behavioral health provider.    7. Nicotine Dependence.  She is trying to quit smoking and was advised to continue using Nicorette gum as needed. She was informed that smoking cessation is important to prevent future lung disease.    8. Health Maintenance.  She was advised to get a Hepatitis A vaccine today and return for the second dose in 6 months. She was also  advised to schedule a Pap smear with a gynecologist.    Return in about 3 months (around 11/22/2024) for F/UP BP.    Please note that this dictation was created using voice recognition software. I have made every reasonable attempt to correct obvious errors, but I expect that there are errors of grammar and possibly content that I did not discover before finalizing the note.    Alicia Kramer MD  Family Medicine and Non - Operative Sports Medicine   [facility]

## 2024-08-30 ENCOUNTER — HOSPITAL ENCOUNTER (EMERGENCY)
Facility: MEDICAL CENTER | Age: 64
End: 2024-08-30
Attending: STUDENT IN AN ORGANIZED HEALTH CARE EDUCATION/TRAINING PROGRAM
Payer: COMMERCIAL

## 2024-08-30 VITALS
SYSTOLIC BLOOD PRESSURE: 148 MMHG | HEART RATE: 60 BPM | WEIGHT: 164.9 LBS | TEMPERATURE: 97.8 F | BODY MASS INDEX: 30.35 KG/M2 | HEIGHT: 62 IN | OXYGEN SATURATION: 98 % | DIASTOLIC BLOOD PRESSURE: 80 MMHG | RESPIRATION RATE: 18 BRPM

## 2024-08-30 DIAGNOSIS — Z71.6 ENCOUNTER FOR TOBACCO USE CESSATION COUNSELING: ICD-10-CM

## 2024-08-30 PROCEDURE — 99283 EMERGENCY DEPT VISIT LOW MDM: CPT

## 2024-08-30 RX ORDER — NICOTINE 21 MG/24HR
1 PATCH, TRANSDERMAL 24 HOURS TRANSDERMAL EVERY 24 HOURS
Qty: 30 PATCH | Refills: 0 | Status: SHIPPED | OUTPATIENT
Start: 2024-08-30

## 2024-08-30 ASSESSMENT — FIBROSIS 4 INDEX: FIB4 SCORE: 1.20688762076150991
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
## 2024-08-30 NOTE — ED TRIAGE NOTES
"Pt BIB [facility] with anxiety. Pt called 911 \"just because\" Pt ambulated into triage. When this RN asked why she came in tonight pt sts \"I don't remember\". Per EMS pt had no actual complaint and is seen here frequently. Pt is A&O and ambulatory  "

## 2024-08-30 NOTE — ED NOTES
Vital signs taken and recorded. Discharge in stable condition ambulatory. Health teachings given to patient  with full understanding of the information given. No personal belongings left.

## 2024-08-30 NOTE — ED NOTES
Taken patient from triage waiting room, ambulatory with steady gait, alert/ oriented x 4.Verified patient identification.  Assumed patient care.   Placed on patient room. Connected to cardiac monitor.   Given the call light and instructed to call for any assistance needed/ or concerns.   Bed on lowest position, side rails up, breaks locked. Awaiting for ERP.

## 2024-09-02 ENCOUNTER — HOSPITAL ENCOUNTER (EMERGENCY)
Facility: MEDICAL CENTER | Age: 64
End: 2024-09-02
Attending: EMERGENCY MEDICINE
Payer: COMMERCIAL

## 2024-09-02 VITALS
DIASTOLIC BLOOD PRESSURE: 72 MMHG | WEIGHT: 165 LBS | BODY MASS INDEX: 30.36 KG/M2 | HEIGHT: 62 IN | OXYGEN SATURATION: 97 % | TEMPERATURE: 98.6 F | RESPIRATION RATE: 18 BRPM | SYSTOLIC BLOOD PRESSURE: 136 MMHG | HEART RATE: 71 BPM

## 2024-09-02 DIAGNOSIS — Z71.1 NO PROBLEM, FEARED COMPLAINT UNFOUNDED: ICD-10-CM

## 2024-09-02 PROCEDURE — 99283 EMERGENCY DEPT VISIT LOW MDM: CPT

## 2024-09-02 ASSESSMENT — FIBROSIS 4 INDEX: FIB4 SCORE: 1.20688762076150991

## 2024-09-03 NOTE — ED NOTES
Bedside report to SIDNEY Casillas. Plan of care discussed with patient. Bed locked and in lowest position. Call light available and within reach. Appropriate fall precautions in place. No distress noted. This RN removed from care.

## 2024-09-03 NOTE — ED NOTES
Patient AAOx4. Discharge education/summary reviewed with patient, questions/concerns addressed with pt. Patient verbalized understanding of education provided. Pt ambulatory out to lobby with a steady gait, NAD noted. Pt discharged home to self care.

## 2024-09-03 NOTE — ED TRIAGE NOTES
"Violeta Sophia Nestor  63 y.o.  female  Chief Complaint   Patient presents with    Anxiety     Pt arrives to triage without a chief complaint, states \"I don't remember, I don't feel good\". After some prompts pt reports she is feeling anxious. Pt YEN DOMINGUEZ from home. .      Patient educated on triage process, to alert staff if any changes in condition.'  "
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
## 2024-09-09 ENCOUNTER — HOSPITAL ENCOUNTER (EMERGENCY)
Facility: MEDICAL CENTER | Age: 64
End: 2024-09-09
Attending: EMERGENCY MEDICINE
Payer: COMMERCIAL

## 2024-09-09 ENCOUNTER — TELEPHONE (OUTPATIENT)
Dept: CARDIOLOGY | Facility: MEDICAL CENTER | Age: 64
End: 2024-09-09
Payer: COMMERCIAL

## 2024-09-09 VITALS
OXYGEN SATURATION: 95 % | DIASTOLIC BLOOD PRESSURE: 76 MMHG | TEMPERATURE: 97.8 F | WEIGHT: 165 LBS | HEIGHT: 62 IN | RESPIRATION RATE: 18 BRPM | SYSTOLIC BLOOD PRESSURE: 143 MMHG | HEART RATE: 60 BPM | BODY MASS INDEX: 30.36 KG/M2

## 2024-09-09 DIAGNOSIS — R51.9 NONINTRACTABLE HEADACHE, UNSPECIFIED CHRONICITY PATTERN, UNSPECIFIED HEADACHE TYPE: ICD-10-CM

## 2024-09-09 DIAGNOSIS — F41.9 ANXIETY: ICD-10-CM

## 2024-09-09 PROCEDURE — 99284 EMERGENCY DEPT VISIT MOD MDM: CPT

## 2024-09-09 PROCEDURE — 700102 HCHG RX REV CODE 250 W/ 637 OVERRIDE(OP): Performed by: EMERGENCY MEDICINE

## 2024-09-09 PROCEDURE — A9270 NON-COVERED ITEM OR SERVICE: HCPCS | Performed by: EMERGENCY MEDICINE

## 2024-09-09 RX ORDER — IBUPROFEN 600 MG/1
600 TABLET, FILM COATED ORAL ONCE
Status: COMPLETED | OUTPATIENT
Start: 2024-09-09 | End: 2024-09-09

## 2024-09-09 RX ORDER — ACETAMINOPHEN 325 MG/1
650 TABLET ORAL ONCE
Status: COMPLETED | OUTPATIENT
Start: 2024-09-09 | End: 2024-09-09

## 2024-09-09 RX ADMIN — IBUPROFEN 600 MG: 600 TABLET, FILM COATED ORAL at 15:00

## 2024-09-09 RX ADMIN — ACETAMINOPHEN 650 MG: 325 TABLET ORAL at 15:00

## 2024-09-09 ASSESSMENT — PAIN DESCRIPTION - PAIN TYPE: TYPE: ACUTE PAIN

## 2024-09-09 ASSESSMENT — FIBROSIS 4 INDEX: FIB4 SCORE: 1.20688762076150991

## 2024-09-09 NOTE — ED PROVIDER NOTES
ED Provider Note    CHIEF COMPLAINT  Chief Complaint   Patient presents with    Anxiety    Pain     Generalized pain.       EXTERNAL RECORDS REVIEWED  Other [facility] previous emergency department notes patient history of anxiety as well as amphetamine abuse.  Also has had issues with alcohol    HPI/ROS  LIMITATION TO HISTORY     OUTSIDE HISTORIAN(S):      Violeta Baez is a 63 y.o. female who presents to the emergency department with chief complaint of anxiety.  Patient reports that she is trying to stop drinking.  She reports that she has been very anxious.  She initially stated that she had forgotten why she came to the [facility] emergency room and that she has been very forgetful.  She denies ongoing use of methamphetamine.  She reports that she is having minimal frontal headache.  She reports no fevers chills cough congestion currently she denies any chest pain abdominal pain problems urination bowel movements any other acute symptom change or concern.    PAST MEDICAL HISTORY   has a past medical history of Anxiety, Asthma, CHF (congestive heart failure) ([facility]), Hashimoto's disease, Hypertension, Hypothyroid, Methamphetamine abuse (HCC), Psychiatric disorder, TBI (traumatic brain injury) ([facility]), and Thyroid cancer ([facility]).    SURGICAL HISTORY   has a past surgical history that includes thyroidectomy; lumpectomy (Right); and tendon repair (Right).    FAMILY HISTORY  Family History   Problem Relation Age of Onset    No Known Problems Mother     Lung Cancer Father     No Known Problems Son     No Known Problems Son        SOCIAL HISTORY  Social History     Tobacco Use    Smoking status: Every Day     Current packs/day: 1.00     Average packs/day: 1 pack/day for 35.0 years (35.0 ttl pk-yrs)     Types: Cigarettes    Smokeless tobacco: Never   Vaping Use    Vaping status: Some Days    Substances: Nicotine   Substance and Sexual Activity    Alcohol use: Yes     Alcohol/week: 0.6 oz     Types: 1 Shots of liquor per week  "    Comment: sravani yesterday    Drug use: Not Currently     Frequency: 2.0 times per week     Comment: marijuana & meth\"I don't do them anymore\"    Sexual activity: Yes       CURRENT MEDICATIONS  Home Medications       Reviewed by Tete Hernandez R.N. (Registered Nurse) on 09/09/24 at 1204  Med List Status: Not Addressed     Medication Last Dose Status   albuterol 108 (90 Base) MCG/ACT Aero Soln inhalation aerosol  Active   carvedilol (COREG) 3.125 MG Tab  Active   DULoxetine (CYMBALTA) 60 MG Cap DR Particles delayed-release capsule  Active   levothyroxine (SYNTHROID) 150 MCG Tab  Active   lisinopril-hydrochlorothiazide (PRINZIDE) 20-25 MG per tablet  Active   nicotine (NICODERM) 21 MG/24HR PATCH 24 HR  Active                    ALLERGIES  Allergies   Allergen Reactions    Fluoxetine      Irritability, insomnia       PHYSICAL EXAM  VITAL SIGNS: BP (!) 141/71   Pulse 60   Temp 36.9 °C (98.5 °F) (Temporal)   Resp 18   Ht 1.575 m (5' 2\")   Wt 74.8 kg (165 lb)   SpO2 96%   BMI 30.18 kg/m²      Pulse ox interpretation: I interpret this pulse ox as normal.  Constitutional: Alert and oriented x 3, no acute distress  HEENT: Atraumatic normocephalic, pupils are equal round reactive to light extraocular movements are intact. The nares is clear, external ears are normal, mouth shows moist mucous membranes normal dentition for age  Neck: Supple, no JVD no tracheal deviation  Cardiovascular: Regular rate and rhythm no murmur rub or gallop 2+ pulses peripherally x4  Thorax & Lungs: No respiratory distress, no wheezes rales or rhonchi, No chest tenderness.   GI: Soft nontender nondistended positive bowel sounds, no peritoneal signs  Skin: Warm dry no acute rash or lesion  Musculoskeletal: Moving all extremities with full range and 5 of 5 strength no acute  deformity  Neurologic: Cranial nerves III through XII are grossly intact no sensory deficit no cerebellar dysfunction   Psychiatric: Appropriate affect for situation " "at this time            COURSE & MEDICAL DECISION MAKING    ASSESSMENT, COURSE AND PLAN  Care Narrative: Pleasant 63-year-old female has had previous issues with substance abuse however denies ongoing methamphetamine use she states that she is trying to refrain from alcohol.  Minimal frontal headache.  No concern for intracranial hemorrhage or infection given dose ibuprofen Tylenol feeling better at this time.  Vital signs are unremarkable physical exam is reassuring.  Instructed to return for any worsening symptom changes or concerns otherwise discharged in stable and improved condition.            ADDITIONAL PROBLEMS MANAGED      DISPOSITION AND DISCUSSIONS  I have discussed management of the patient with the following physicians and JOSÉ LUIS's:      Discussion of management with other QHP or appropriate source(s):      Escalation of care considered, and ultimately not performed:Laboratory analysis and diagnostic imaging    Barriers to care at this time, including but not limited to: Patient does not have established PCP.     Decision tools and prescription drugs considered including, but not limited to: .  BP (!) 143/76   Pulse 60   Temp 36.6 °C (97.8 °F) (Temporal)   Resp 18   Ht 1.575 m (5' 2\")   Wt 74.8 kg (165 lb)   SpO2 95%   BMI 30.18 kg/m²     [facility]  [address], [address] [address] [phone]  Schedule an appointment as soon as possible for a visit       [facility], Emergency Dept  [address] [facility] [address] [phone]    in 12-24 hours if symptoms persist, immediately If symptoms worsen, or if you develop any other symptoms or concerns      FINAL DIAGNOSIS  1. Anxiety    2. Nonintractable headache, unspecified chronicity pattern, unspecified headache type         Electronically signed by: Ronnie Gan M.D.,       "

## 2024-09-09 NOTE — ED NOTES
DC home with written and verbal instructions regarding f/u, activity.  Verbalized understanding, ambulated out with steady gait and all belongings

## 2024-09-09 NOTE — ED TRIAGE NOTES
"Chief Complaint   Patient presents with    Anxiety    Pain     Generalized pain.     BP (!) 141/71   Pulse 60   Temp 36.9 °C (98.5 °F) (Temporal)   Resp 18   Ht 1.575 m (5' 2\")   Wt 74.8 kg (165 lb)   SpO2 96%   BMI 30.18 kg/m²     Pt brought in by [facility], into triage by WC. Educated on triage process. Instructed to notify staff for any worsening symptoms.  "

## 2024-09-09 NOTE — ED NOTES
"Patient Identifiers verified; wheeled to room via Wheel chair; connected to BP and Pulse ox monitoring; charted the patient for Emergency Room Physician to see.  Patient is very repetitive but using clear speech patterns. When asked why she is in the ER today patient states \"well I think I messed up on my meds\" when staff asked for clarification patient states \"my thyroid stuff, but I'm also trying to quit smoking cigarettes, I think I might be okay now though\" staff inquired if patient still wants to be seen by a doctor and patient states \"yes I think I should\"  "

## 2024-09-11 ENCOUNTER — HOSPITAL ENCOUNTER (EMERGENCY)
Facility: MEDICAL CENTER | Age: 64
End: 2024-09-11
Attending: EMERGENCY MEDICINE
Payer: COMMERCIAL

## 2024-09-11 VITALS
HEART RATE: 83 BPM | DIASTOLIC BLOOD PRESSURE: 76 MMHG | BODY MASS INDEX: 30.18 KG/M2 | TEMPERATURE: 98.3 F | WEIGHT: 165 LBS | OXYGEN SATURATION: 97 % | SYSTOLIC BLOOD PRESSURE: 118 MMHG | RESPIRATION RATE: 16 BRPM

## 2024-09-11 DIAGNOSIS — F10.929 ALCOHOLIC INTOXICATION WITH COMPLICATION (HCC): ICD-10-CM

## 2024-09-11 DIAGNOSIS — F41.9 ANXIETY: ICD-10-CM

## 2024-09-11 PROCEDURE — A9270 NON-COVERED ITEM OR SERVICE: HCPCS | Performed by: EMERGENCY MEDICINE

## 2024-09-11 PROCEDURE — 99284 EMERGENCY DEPT VISIT MOD MDM: CPT

## 2024-09-11 PROCEDURE — 700102 HCHG RX REV CODE 250 W/ 637 OVERRIDE(OP): Performed by: EMERGENCY MEDICINE

## 2024-09-11 RX ORDER — HYDROXYZINE HYDROCHLORIDE 25 MG/1
25 TABLET, FILM COATED ORAL ONCE
Status: COMPLETED | OUTPATIENT
Start: 2024-09-11 | End: 2024-09-11

## 2024-09-11 RX ADMIN — HYDROXYZINE HYDROCHLORIDE 25 MG: 25 TABLET, FILM COATED ORAL at 12:02

## 2024-09-11 ASSESSMENT — FIBROSIS 4 INDEX: FIB4 SCORE: 1.20688762076150991

## 2024-09-11 NOTE — ED NOTES
Patient ready for discharge. Instructions given to patient. Ambulated to lobby with belongings and steady gate.

## 2024-09-11 NOTE — ED PROVIDER NOTES
ED Provider Note    CHIEF COMPLAINT  Chief Complaint   Patient presents with    Alcohol Intoxication     Pt states she drank a pint of vodka this am and want help to detox       EXTERNAL RECORDS REVIEWED  Inpatient Notes ED note 9/9/24 when the patient was evaluated for anxiety and a headache    HPI/ROS  LIMITATION TO HISTORY   Select: Intoxication  OUTSIDE HISTORIAN(S):  None    Violeta Baez is a 63 y.o. female who presents to the emergency department for evaluation of alcohol intoxication.  The patient states that she has been drinking a pint of vodka a day for the last couple of months.  She states that she does not because she is bored.  She states that she would like help stopping drinking.  She denies any history of alcohol withdrawal and has never had seizures before.  She states that she last drank this morning.  She denies any chest pain, shortness of breath, vomiting, diarrhea, or abdominal pain.  She states that she did feel slightly nauseated this morning.  She denies any suicidal ideations.  She denies any homicidal ideations.  She states that she has used marijuana previously but not recently.    PAST MEDICAL HISTORY   has a past medical history of Anxiety, Asthma, CHF (congestive heart failure) (HCC), Hashimoto's disease, Hypertension, Hypothyroid, Methamphetamine abuse (HCC), Psychiatric disorder, TBI (traumatic brain injury) (HCC), and Thyroid cancer (HCC).    SURGICAL HISTORY   has a past surgical history that includes thyroidectomy; lumpectomy (Right); and tendon repair (Right).    FAMILY HISTORY  Family History   Problem Relation Age of Onset    No Known Problems Mother     Lung Cancer Father     No Known Problems Son     No Known Problems Son        SOCIAL HISTORY  Social History     Tobacco Use    Smoking status: Every Day     Current packs/day: 1.00     Average packs/day: 1 pack/day for 35.0 years (35.0 ttl pk-yrs)     Types: Cigarettes    Smokeless tobacco: Never   Vaping Use     "Vaping status: Some Days    Substances: Nicotine   Substance and Sexual Activity    Alcohol use: Yes     Alcohol/week: 0.6 oz     Types: 1 Shots of liquor per week     Comment: vodka yesterday    Drug use: Not Currently     Frequency: 2.0 times per week     Comment: marijuana & meth\"I don't do them anymore\"    Sexual activity: Yes       CURRENT MEDICATIONS  Home Medications    **Home medications have not yet been reviewed for this encounter**         ALLERGIES  Allergies   Allergen Reactions    Fluoxetine      Irritability, insomnia       PHYSICAL EXAM  VITAL SIGNS: /76   Pulse 83   Temp 36.8 °C (98.3 °F) (Temporal)   Resp 16   Wt 74.8 kg (165 lb)   SpO2 97%   BMI 30.18 kg/m²   Constitutional: Alert and in no apparent distress.  HENT: Normocephalic atraumatic. Bilateral external ears normal. Nose normal. Mucous membranes are moist.  Eyes: Pupils are equal and reactive. Conjunctiva normal. Non-icteric sclera.   Neck: Normal range of motion without tenderness. Supple. No meningeal signs.  Cardiovascular: Regular rate and rhythm. No murmurs, gallops or rubs.  Thorax & Lungs: No increased work of breathing. Breath sounds are clear to auscultation bilaterally. No wheezing, rhonchi or rales.  Abdomen: Soft, nontender and nondistended.   Skin: Warm and dry.   Back: No bony tenderness, No CVA tenderness.   Extremities: 2+ peripheral pulses. Cap refill is less than 2 seconds. No edema, cyanosis, or clubbing.  Musculoskeletal: Good range of motion in all major joints. No tenderness to palpation or major deformities noted.   Psych: Anxious affect.  No suicidal or homicidal ideations.  No flight of ideas or pressured speech.  She does not appear to be reacting to internal stimuli.  Neurologic: Alert and oriented x 3. The patient moves all 4 extremities without obvious deficits. Steady gate.    COURSE & MEDICAL DECISION MAKING    ASSESSMENT, COURSE AND PLAN  Care Narrative: This is a 63-year-old female presenting to " the emergency department for evaluation of alcohol intoxication.  On initial evaluation, the patient did not appear to be in any acute distress.  Vital signs are reassuring.  Physical exam was was reassuring.  She was alert and oriented x 3 and did not appear intoxicated.  She was walking with a steady gait.  She had no evidence of tremulousness and her vital signs are reassuring.  She was not hallucinating.  I am less concerned for alcohol withdrawal.  She denied any other concerning signs or symptoms and was not suicidal or homicidal.  She was cleared medically to be evaluated by the alert team.    immanuel Small, evaluated the patient was able to give her resources.  She was given a cab voucher to get to [place] where she will get resources to help her with her alcohol issues.  She has no evidence of withdrawal here in the ED.  She did have some mild anxiety but has a history of this.  She was treated with a dose of hydroxyzine before discharge.  I do think she stable for discharge at this point.  I encouraged her to follow-up with the resources she was given and to return to the ED with any worsening signs or symptoms.    ADDITIONAL PROBLEMS MANAGED  Alcohol intoxication, anxiety    DISPOSITION AND DISCUSSIONS  I have discussed management of the patient with the following physicians and JOSÉ LUIS's: None    Discussion of management with other QHP or appropriate source(s): Behavioral Health immanuel Small team      Escalation of care considered, and ultimately not performed:acute inpatient care management, however at this time, the patient is most appropriate for outpatient management    Barriers to care at this time, including but not limited to:  None .     Decision tools and prescription drugs considered including, but not limited to:  None .    FINAL IMPRESSION  1. Alcoholic intoxication with complication (HCC)    2. Anxiety      PRESCRIPTIONS  New Prescriptions    No medications on file     FOLLOW UP  Alicia GUZMAN  [facility]  [address]  [place] [address]  [phone]    Call in 1 day  To schedule a follow up appointment    [facility], Emergency Dept  [address] [facility] [id number] [phone]  Go to   As needed    -DISCHARGE-    Electronically signed by: Elvira Morgan D.O., 9/11/2024 9:50 AM

## 2024-09-11 NOTE — ED TRIAGE NOTES
Pt bib [facility] to rm red g31 .  Chief Complaint   Patient presents with    Alcohol Intoxication     Pt states she drank a pint of vodka this am and want help to detox

## 2024-09-15 ENCOUNTER — HOSPITAL ENCOUNTER (EMERGENCY)
Facility: MEDICAL CENTER | Age: 64
End: 2024-09-15
Attending: EMERGENCY MEDICINE
Payer: COMMERCIAL

## 2024-09-15 VITALS
BODY MASS INDEX: 29.15 KG/M2 | WEIGHT: 159.39 LBS | HEART RATE: 51 BPM | TEMPERATURE: 97.8 F | DIASTOLIC BLOOD PRESSURE: 77 MMHG | OXYGEN SATURATION: 93 % | RESPIRATION RATE: 16 BRPM | SYSTOLIC BLOOD PRESSURE: 161 MMHG

## 2024-09-15 DIAGNOSIS — Z76.0 MEDICATION REFILL: ICD-10-CM

## 2024-09-15 DIAGNOSIS — F41.9 ANXIETY: ICD-10-CM

## 2024-09-15 LAB
AMPHET UR QL SCN: NEGATIVE
BARBITURATES UR QL SCN: NEGATIVE
BENZODIAZ UR QL SCN: NEGATIVE
BZE UR QL SCN: NEGATIVE
CANNABINOIDS UR QL SCN: NEGATIVE
FENTANYL UR QL: NEGATIVE
METHADONE UR QL SCN: NEGATIVE
OPIATES UR QL SCN: NEGATIVE
OXYCODONE UR QL SCN: NEGATIVE
PCP UR QL SCN: NEGATIVE
POC BREATHALIZER: 0 PERCENT (ref 0–0.01)
PROPOXYPH UR QL SCN: NEGATIVE

## 2024-09-15 PROCEDURE — 80307 DRUG TEST PRSMV CHEM ANLYZR: CPT

## 2024-09-15 PROCEDURE — 302970 POC BREATHALIZER: Performed by: EMERGENCY MEDICINE

## 2024-09-15 PROCEDURE — 99284 EMERGENCY DEPT VISIT MOD MDM: CPT

## 2024-09-15 RX ORDER — CARVEDILOL 3.12 MG/1
3.12 TABLET ORAL 2 TIMES DAILY WITH MEALS
Qty: 60 TABLET | Refills: 0 | Status: SHIPPED | OUTPATIENT
Start: 2024-09-15

## 2024-09-15 RX ORDER — DULOXETIN HYDROCHLORIDE 60 MG/1
60 CAPSULE, DELAYED RELEASE ORAL DAILY
Qty: 30 CAPSULE | Refills: 0 | Status: SHIPPED | OUTPATIENT
Start: 2024-09-15

## 2024-09-15 RX ORDER — LISINOPRIL AND HYDROCHLOROTHIAZIDE 20; 25 MG/1; MG/1
1 TABLET ORAL DAILY
Qty: 30 TABLET | Refills: 0 | Status: SHIPPED | OUTPATIENT
Start: 2024-09-15

## 2024-09-15 RX ORDER — LEVOTHYROXINE SODIUM 150 UG/1
150 TABLET ORAL
Qty: 30 TABLET | Refills: 0 | Status: SHIPPED | OUTPATIENT
Start: 2024-09-15 | End: 2024-09-16

## 2024-09-15 ASSESSMENT — FIBROSIS 4 INDEX: FIB4 SCORE: 1.20688762076150991

## 2024-09-15 NOTE — ED NOTES
Breathalyzer performed.  Pt ambulated to and from restroom with steady gait.  Urine sample collected and sent.

## 2024-09-15 NOTE — ED NOTES
Pt discharged to lobby with steady gait.  Pt alert and oriented times 4 on room air.  Pt in possession of belongings.  Pt received copy of discharge instructions and verbalized understanding. Encouraged to follow up with PCP. Pt provided bus pass. BP (!) 161/77   Pulse (!) 51   Temp 36.6 °C (97.8 °F) (Temporal)   Resp 16   Wt 72.3 kg (159 lb 6.3 oz)   SpO2 93%   BMI 29.15 kg/m²

## 2024-09-15 NOTE — ED PROVIDER NOTES
"ED Provider Note    CHIEF COMPLAINT  Chief Complaint   Patient presents with    Anxiety     Pt arrives to ER stating \"I don't remember why I am here. \"  Arrived via [facility], pt uncertain why she called them, but their report is she showed s/s of psychosis secondary to smoking something.    Psych Eval     Pt presents disorganized and confused.  Unclear story of why she is here.  She states, \"I did not drink alcohol, but I wanted.\"       EXTERNAL RECORDS REVIEWED  Reviewed previous ER visits medication list    HPI/ROS  LIMITATION TO HISTORY   Patient is a poor historian  OUTSIDE HISTORIAN(S):  None    Violeta Baez is a 63 y.o. female who presents for evaluation of anxiety, some transient confusion.  The patient is well-known to our facility.  She has a longstanding history of polysubstance abuse including alcohol and methamphetamines.  She reports being clean from drugs and alcohol for the last 24 hours.  No report of any recent trauma or head injury.  She did run out of her Cymbalta and Synthroid no other medications recently.  Is unclear whether or not she lost them they were stolen or she simply needs a refill.  She denies auditory or visual his Nations.  No report of any suicidal ideation or attempt    PAST MEDICAL HISTORY   has a past medical history of Anxiety, Asthma, CHF (congestive heart failure) (HCC), Hashimoto's disease, Hypertension, Hypothyroid, Methamphetamine abuse (HCC), Psychiatric disorder, TBI (traumatic brain injury) (HCC), and Thyroid cancer (HCC).    SURGICAL HISTORY   has a past surgical history that includes thyroidectomy; lumpectomy (Right); and tendon repair (Right).    FAMILY HISTORY  Family History   Problem Relation Age of Onset    No Known Problems Mother     Lung Cancer Father     No Known Problems Son     No Known Problems Son        SOCIAL HISTORY  Social History     Tobacco Use    Smoking status: Every Day     Current packs/day: 1.00     Average packs/day: 1 pack/day for 35.0 " "years (35.0 ttl pk-yrs)     Types: Cigarettes    Smokeless tobacco: Never   Vaping Use    Vaping status: Some Days    Substances: Nicotine   Substance and Sexual Activity    Alcohol use: Yes     Alcohol/week: 0.6 oz     Types: 1 Shots of liquor per week     Comment: vodka yesterday    Drug use: Not Currently     Frequency: 2.0 times per week     Comment: marijuana & meth\"I don't do them anymore\"    Sexual activity: Yes       CURRENT MEDICATIONS  Home Medications       Reviewed by Caryl Padilla R.N. (Registered Nurse) on 09/15/24 at 1150  Med List Status: Partial     Medication Last Dose Status   albuterol 108 (90 Base) MCG/ACT Aero Soln inhalation aerosol  Active   carvedilol (COREG) 3.125 MG Tab  Active   DULoxetine (CYMBALTA) 60 MG Cap DR Particles delayed-release capsule  Active   levothyroxine (SYNTHROID) 150 MCG Tab  Active   lisinopril-hydrochlorothiazide (PRINZIDE) 20-25 MG per tablet  Active   nicotine (NICODERM) 21 MG/24HR PATCH 24 HR  Active                    ALLERGIES  Allergies   Allergen Reactions    Fluoxetine      Irritability, insomnia       PHYSICAL EXAM  VITAL SIGNS: BP (!) 154/68   Pulse (!) 59   Temp 36.7 °C (98.1 °F) (Temporal)   Resp 18   Wt 72.3 kg (159 lb 6.3 oz)   SpO2 96%   BMI 29.15 kg/m²    Pulse ox interpretation: I interpret this pulse ox as normal.  Constitutional: Alert and oriented x 3, no acute distress  HEENT: Atraumatic normocephalic, pupils are equal round reactive to light extraocular movements are intact. The nares is clear, external ears are normal, mouth shows moist mucous membranes normal dentition for age  Neck: Supple, no JVD no tracheal deviation  Cardiovascular: Regular rate and rhythm no murmur rub or gallop 2+ pulses peripherally x4  Thorax & Lungs: No respiratory distress, no wheezes rales or rhonchi, No chest tenderness.   GI: Soft nontender nondistended positive bowel sounds, no peritoneal signs  Skin: Warm dry no acute rash or lesion  Musculoskeletal: " Moving all extremities with full range and 5 of 5 strength no acute  deformity  Neurologic: Moving all extremities no seizure activity cranial nerves III through XII are grossly intact no sensory deficit no cerebellar dysfunction   Psychiatric: Patient appears to have capacity oriented x 4.  Does not endorse auditory visual hallucinations denies suicidality          EKG/LABS  Results for orders placed or performed during the hospital encounter of 09/15/24   URINE DRUG SCREEN   Result Value Ref Range    Amphetamines Urine Negative Negative    Barbiturates Negative Negative    Benzodiazepines Negative Negative    Cocaine Metabolite Negative Negative    Fentanyl, Urine Negative Negative    Methadone Negative Negative    Opiates Negative Negative    Oxycodone Negative Negative    Phencyclidine -Pcp Negative Negative    Propoxyphene Negative Negative    Cannabinoid Metab Negative Negative   POC BREATHALIZER   Result Value Ref Range    POC Breathalizer 0.000 0.00 - 0.01 Percent        RADIOLOGY/PROCEDURES   None indicated  COURSE & MEDICAL DECISION MAKING    ASSESSMENT, COURSE AND PLAN  Care Narrative:     This is a very pleasant 63-year-old female well-known to this department as well as myself.  Here I considered but did not feel that extensive blood testing or imaging is indicated.  She has some transient confusion but on arrival here as capacity is oriented x 4 and did not endorse any recent drug or alcohol use.  She did request refill of her medications including her antihypertensives, Cymbalta and Synthroid which will be provided.  Geovanny with life skills knows her quite well and agree that there is no evidence of acute suicidality or psychosis to merit legal hold or further psychiatric stabilization          ADDITIONAL PROBLEMS MANAGED      DISPOSITION AND DISCUSSIONS  I have discussed management of the patient with the following physicians and JOSÉ LUIS's: None    Discussion of management with other \[facility] [place] [facility]\"" or appropriate  source(s): None    Escalation of care considered, and ultimately not performed: Consider laboratory and imaging studies    Barriers to care at this time, including but not limited to: None.     Decision tools and prescription drugs considered including, but not limited to: Patient will be provided refills of her antihypertensive Synthroid and Cymbalta.    FINAL DIAGNOSIS  1. Anxiety        2. Medication refill  levothyroxine (SYNTHROID) 150 MCG Tab    carvedilol (COREG) 3.125 MG Tab    lisinopril-hydrochlorothiazide (PRINZIDE) 20-25 MG per tablet    DULoxetine (CYMBALTA) 60 MG Cap DR Particles delayed-release capsule             Electronically signed by: Haile Terrell M.D., 9/15/2024 12:26 PM

## 2024-09-16 RX ORDER — LEVOTHYROXINE SODIUM 150 UG/1
150 TABLET ORAL
Qty: 30 TABLET | Refills: 2 | Status: SHIPPED | OUTPATIENT
Start: 2024-09-16

## 2024-09-19 ENCOUNTER — HOSPITAL ENCOUNTER (EMERGENCY)
Facility: MEDICAL CENTER | Age: 64
End: 2024-09-19
Attending: STUDENT IN AN ORGANIZED HEALTH CARE EDUCATION/TRAINING PROGRAM
Payer: COMMERCIAL

## 2024-09-19 VITALS
HEIGHT: 62 IN | WEIGHT: 159.17 LBS | BODY MASS INDEX: 29.29 KG/M2 | TEMPERATURE: 98.3 F | SYSTOLIC BLOOD PRESSURE: 125 MMHG | RESPIRATION RATE: 14 BRPM | HEART RATE: 55 BPM | OXYGEN SATURATION: 95 % | DIASTOLIC BLOOD PRESSURE: 60 MMHG

## 2024-09-19 DIAGNOSIS — F41.9 ANXIETY: Primary | ICD-10-CM

## 2024-09-19 PROCEDURE — 700102 HCHG RX REV CODE 250 W/ 637 OVERRIDE(OP): Performed by: STUDENT IN AN ORGANIZED HEALTH CARE EDUCATION/TRAINING PROGRAM

## 2024-09-19 PROCEDURE — A9270 NON-COVERED ITEM OR SERVICE: HCPCS | Performed by: STUDENT IN AN ORGANIZED HEALTH CARE EDUCATION/TRAINING PROGRAM

## 2024-09-19 PROCEDURE — 99284 EMERGENCY DEPT VISIT MOD MDM: CPT

## 2024-09-19 RX ORDER — HYDROXYZINE HYDROCHLORIDE 25 MG/1
25 TABLET, FILM COATED ORAL 3 TIMES DAILY PRN
Qty: 30 TABLET | Refills: 0 | Status: SHIPPED | OUTPATIENT
Start: 2024-09-19

## 2024-09-19 RX ORDER — HYDROXYZINE HYDROCHLORIDE 25 MG/1
25 TABLET, FILM COATED ORAL ONCE
Status: COMPLETED | OUTPATIENT
Start: 2024-09-19 | End: 2024-09-19

## 2024-09-19 RX ADMIN — HYDROXYZINE HYDROCHLORIDE 25 MG: 25 TABLET, FILM COATED ORAL at 20:03

## 2024-09-19 ASSESSMENT — FIBROSIS 4 INDEX: FIB4 SCORE: 1.20688762076150991

## 2024-09-20 NOTE — DISCHARGE INSTRUCTIONS
Please follow-up with your psychiatrist for further anxiety medications.  Your blood pressure was also elevated here therefore follow-up with your primary care doctor for further titration of your blood pressure medications.  Return for any suicidal ideation or any other concerns.
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
## 2024-09-29 ENCOUNTER — HOSPITAL ENCOUNTER (EMERGENCY)
Facility: MEDICAL CENTER | Age: 64
End: 2024-09-29
Attending: EMERGENCY MEDICINE
Payer: COMMERCIAL

## 2024-09-29 VITALS
HEIGHT: 62 IN | DIASTOLIC BLOOD PRESSURE: 70 MMHG | RESPIRATION RATE: 16 BRPM | TEMPERATURE: 98.6 F | BODY MASS INDEX: 29.53 KG/M2 | WEIGHT: 160.5 LBS | HEART RATE: 56 BPM | OXYGEN SATURATION: 90 % | SYSTOLIC BLOOD PRESSURE: 114 MMHG

## 2024-09-29 DIAGNOSIS — F41.9 ANXIETY: ICD-10-CM

## 2024-09-29 DIAGNOSIS — F15.10 METHAMPHETAMINE ABUSE (HCC): ICD-10-CM

## 2024-09-29 PROCEDURE — A9270 NON-COVERED ITEM OR SERVICE: HCPCS | Performed by: EMERGENCY MEDICINE

## 2024-09-29 PROCEDURE — 700102 HCHG RX REV CODE 250 W/ 637 OVERRIDE(OP): Performed by: EMERGENCY MEDICINE

## 2024-09-29 PROCEDURE — 99283 EMERGENCY DEPT VISIT LOW MDM: CPT

## 2024-09-29 RX ORDER — LORAZEPAM 1 MG/1
1 TABLET ORAL ONCE
Status: COMPLETED | OUTPATIENT
Start: 2024-09-29 | End: 2024-09-29

## 2024-09-29 RX ADMIN — LORAZEPAM 1 MG: 1 TABLET ORAL at 13:12

## 2024-09-29 ASSESSMENT — FIBROSIS 4 INDEX: FIB4 SCORE: 1.20688762076150991

## 2024-09-29 NOTE — ED NOTES
Discharge instructions reviewed with patient and signed. States that she already has detox resources. They verbalized understanding of follow up instructions. All belongings with patient. Ambulate with a steady gait
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
## 2024-09-29 NOTE — ED TRIAGE NOTES
"Chief Complaint   Patient presents with    Anxiety     Pt states smoked meth and drank alcohol. Pt states she wants to go to rehab       Pt BIB EMS for above. Pt states she wants to quit smoking cigarettes as well. Pt aox4, gcs 15. Pt ambulatory        /61   Pulse 66   Temp 37 °C (98.6 °F)   Resp 16   Ht 1.575 m (5' 2\")   Wt 72.8 kg (160 lb 7.9 oz)   SpO2 96%   BMI 29.35 kg/m²     "

## 2024-09-30 ENCOUNTER — OFFICE VISIT (OUTPATIENT)
Dept: BEHAVIORAL HEALTH | Facility: CLINIC | Age: 64
End: 2024-09-30
Payer: COMMERCIAL

## 2024-09-30 DIAGNOSIS — F41.1 GAD (GENERALIZED ANXIETY DISORDER): ICD-10-CM

## 2024-09-30 DIAGNOSIS — F43.10 PTSD (POST-TRAUMATIC STRESS DISORDER): ICD-10-CM

## 2024-09-30 DIAGNOSIS — F39 MOOD DISORDER (HCC): ICD-10-CM

## 2024-09-30 PROCEDURE — 90837 PSYTX W PT 60 MINUTES: CPT | Performed by: PSYCHIATRY & NEUROLOGY
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
## 2024-10-03 ENCOUNTER — HOSPITAL ENCOUNTER (OUTPATIENT)
Dept: LAB | Facility: MEDICAL CENTER | Age: 64
End: 2024-10-03
Attending: FAMILY MEDICINE
Payer: COMMERCIAL

## 2024-10-03 DIAGNOSIS — Z11.3 ROUTINE SCREENING FOR STI (SEXUALLY TRANSMITTED INFECTION): ICD-10-CM

## 2024-10-03 DIAGNOSIS — E06.3 HYPOTHYROIDISM DUE TO HASHIMOTO'S THYROIDITIS: ICD-10-CM

## 2024-10-03 LAB
HIV 1+2 AB+HIV1 P24 AG SERPL QL IA: NORMAL
T3 SERPL-MCNC: 85.7 NG/DL (ref 60–181)
T4 FREE SERPL-MCNC: 1.73 NG/DL (ref 0.93–1.7)
TSH SERPL-ACNC: 0.56 UIU/ML (ref 0.35–5.5)

## 2024-10-03 PROCEDURE — 36415 COLL VENOUS BLD VENIPUNCTURE: CPT

## 2024-10-03 PROCEDURE — 84439 ASSAY OF FREE THYROXINE: CPT

## 2024-10-03 PROCEDURE — 87389 HIV-1 AG W/HIV-1&-2 AB AG IA: CPT

## 2024-10-03 PROCEDURE — 84443 ASSAY THYROID STIM HORMONE: CPT

## 2024-10-03 PROCEDURE — 84480 ASSAY TRIIODOTHYRONINE (T3): CPT

## 2024-10-09 DIAGNOSIS — J45.20 MILD INTERMITTENT ASTHMA WITHOUT COMPLICATION: ICD-10-CM

## 2024-10-11 RX ORDER — ALBUTEROL SULFATE 90 UG/1
2 INHALANT RESPIRATORY (INHALATION) EVERY 6 HOURS PRN
Qty: 8.5 G | Refills: 0 | Status: SHIPPED | OUTPATIENT
Start: 2024-10-11

## 2024-10-14 ENCOUNTER — OFFICE VISIT (OUTPATIENT)
Dept: BEHAVIORAL HEALTH | Facility: CLINIC | Age: 64
End: 2024-10-14
Payer: COMMERCIAL

## 2024-10-14 DIAGNOSIS — F19.10 SUBSTANCE ABUSE (HCC): ICD-10-CM

## 2024-10-14 DIAGNOSIS — F43.10 PTSD (POST-TRAUMATIC STRESS DISORDER): ICD-10-CM

## 2024-10-14 DIAGNOSIS — F41.1 GAD (GENERALIZED ANXIETY DISORDER): ICD-10-CM

## 2024-10-14 PROCEDURE — 90837 PSYTX W PT 60 MINUTES: CPT | Mod: GC | Performed by: PSYCHIATRY & NEUROLOGY

## 2024-10-16 ENCOUNTER — OFFICE VISIT (OUTPATIENT)
Dept: MEDICAL GROUP | Facility: PHYSICIAN GROUP | Age: 64
End: 2024-10-16
Payer: COMMERCIAL

## 2024-10-16 VITALS
TEMPERATURE: 97 F | HEIGHT: 62 IN | SYSTOLIC BLOOD PRESSURE: 120 MMHG | BODY MASS INDEX: 30.91 KG/M2 | HEART RATE: 66 BPM | DIASTOLIC BLOOD PRESSURE: 70 MMHG | WEIGHT: 168 LBS | OXYGEN SATURATION: 96 %

## 2024-10-16 DIAGNOSIS — Z72.0 TOBACCO ABUSE: ICD-10-CM

## 2024-10-16 DIAGNOSIS — F41.9 ANXIETY: ICD-10-CM

## 2024-10-16 DIAGNOSIS — I10 PRIMARY HYPERTENSION: ICD-10-CM

## 2024-10-16 DIAGNOSIS — F10.10 ALCOHOL ABUSE: ICD-10-CM

## 2024-10-16 DIAGNOSIS — E89.0 POSTOPERATIVE HYPOTHYROIDISM: ICD-10-CM

## 2024-10-16 PROCEDURE — 3074F SYST BP LT 130 MM HG: CPT | Performed by: FAMILY MEDICINE

## 2024-10-16 PROCEDURE — 3078F DIAST BP <80 MM HG: CPT | Performed by: FAMILY MEDICINE

## 2024-10-16 PROCEDURE — 99214 OFFICE O/P EST MOD 30 MIN: CPT | Performed by: FAMILY MEDICINE

## 2024-10-16 RX ORDER — CICLOPIROX 80 MG/ML
SOLUTION TOPICAL
Qty: 6 ML | Refills: 3 | Status: SHIPPED | OUTPATIENT
Start: 2024-10-16

## 2024-10-16 RX ORDER — ERGOCALCIFEROL 1.25 MG/1
5000 CAPSULE ORAL
Qty: 30 CAPSULE | Refills: 3 | Status: SHIPPED | OUTPATIENT
Start: 2024-10-16

## 2024-10-16 RX ORDER — NICOTINE 21 MG/24HR
1 PATCH, TRANSDERMAL 24 HOURS TRANSDERMAL EVERY 24 HOURS
Qty: 30 PATCH | Refills: 0 | Status: SHIPPED | OUTPATIENT
Start: 2024-10-16

## 2024-10-16 RX ORDER — LISINOPRIL AND HYDROCHLOROTHIAZIDE 20; 25 MG/1; MG/1
1 TABLET ORAL
Qty: 30 TABLET | Refills: 2 | Status: SHIPPED | OUTPATIENT
Start: 2024-10-16 | End: 2024-10-24 | Stop reason: SDUPTHER

## 2024-10-16 ASSESSMENT — FIBROSIS 4 INDEX: FIB4 SCORE: 1.20688762076150991

## 2024-10-16 ASSESSMENT — ENCOUNTER SYMPTOMS
SHORTNESS OF BREATH: 0
NAUSEA: 0
FEVER: 0
VOMITING: 0
ABDOMINAL PAIN: 0
PALPITATIONS: 0
SORE THROAT: 0
CHILLS: 0
COUGH: 0

## 2024-10-21 NOTE — PSYCHOTHERAPY
She reports she lives in the [facility]. Mrs. Baez described during the appointment that times she was happy before included when she chiquis to the [place] [employer] and ordered pasta with lots of different sauces to try, and when she went to the [facility] and sang a heavy metal song which she enjoys singing and can actually successfully perform. She described her neighbor went with her once. She describes continued distress with different situations in her life.

## 2024-10-22 ASSESSMENT — ENCOUNTER SYMPTOMS
NAUSEA: 0
SYNCOPE: 0
IRREGULAR HEARTBEAT: 0
DIZZINESS: 0
WEAKNESS: 0
DIARRHEA: 0
COUGH: 0
ORTHOPNEA: 0
FEVER: 0
WHEEZING: 0
NEAR-SYNCOPE: 0
VOMITING: 0
PALPITATIONS: 0
NIGHT SWEATS: 0
FOCAL WEAKNESS: 0
DYSPNEA ON EXERTION: 0
PND: 0
SHORTNESS OF BREATH: 0
ABDOMINAL PAIN: 0

## 2024-10-24 ENCOUNTER — OFFICE VISIT (OUTPATIENT)
Dept: CARDIOLOGY | Facility: MEDICAL CENTER | Age: 64
End: 2024-10-24
Attending: STUDENT IN AN ORGANIZED HEALTH CARE EDUCATION/TRAINING PROGRAM
Payer: COMMERCIAL

## 2024-10-24 VITALS
SYSTOLIC BLOOD PRESSURE: 152 MMHG | WEIGHT: 168.2 LBS | BODY MASS INDEX: 30.95 KG/M2 | DIASTOLIC BLOOD PRESSURE: 84 MMHG | HEART RATE: 54 BPM | HEIGHT: 62 IN | OXYGEN SATURATION: 93 % | RESPIRATION RATE: 16 BRPM

## 2024-10-24 DIAGNOSIS — I50.32 CHRONIC HEART FAILURE WITH PRESERVED EJECTION FRACTION (HCC): ICD-10-CM

## 2024-10-24 DIAGNOSIS — I50.9 CHF (NYHA CLASS II, ACC/AHA STAGE C) (HCC): ICD-10-CM

## 2024-10-24 DIAGNOSIS — R06.09 OTHER FORMS OF DYSPNEA: ICD-10-CM

## 2024-10-24 DIAGNOSIS — I10 ESSENTIAL HYPERTENSION: ICD-10-CM

## 2024-10-24 DIAGNOSIS — F17.210 CIGARETTE SMOKER: ICD-10-CM

## 2024-10-24 PROCEDURE — 3079F DIAST BP 80-89 MM HG: CPT | Performed by: STUDENT IN AN ORGANIZED HEALTH CARE EDUCATION/TRAINING PROGRAM

## 2024-10-24 PROCEDURE — 99406 BEHAV CHNG SMOKING 3-10 MIN: CPT | Performed by: STUDENT IN AN ORGANIZED HEALTH CARE EDUCATION/TRAINING PROGRAM

## 2024-10-24 PROCEDURE — 94618 PULMONARY STRESS TESTING: CPT | Performed by: STUDENT IN AN ORGANIZED HEALTH CARE EDUCATION/TRAINING PROGRAM

## 2024-10-24 PROCEDURE — 3077F SYST BP >= 140 MM HG: CPT | Performed by: STUDENT IN AN ORGANIZED HEALTH CARE EDUCATION/TRAINING PROGRAM

## 2024-10-24 PROCEDURE — 99212 OFFICE O/P EST SF 10 MIN: CPT | Performed by: STUDENT IN AN ORGANIZED HEALTH CARE EDUCATION/TRAINING PROGRAM

## 2024-10-24 PROCEDURE — 94618 PULMONARY STRESS TESTING: CPT | Mod: 26 | Performed by: STUDENT IN AN ORGANIZED HEALTH CARE EDUCATION/TRAINING PROGRAM

## 2024-10-24 PROCEDURE — 99205 OFFICE O/P NEW HI 60 MIN: CPT | Mod: 25 | Performed by: STUDENT IN AN ORGANIZED HEALTH CARE EDUCATION/TRAINING PROGRAM

## 2024-10-24 RX ORDER — SPIRONOLACTONE 25 MG/1
25 TABLET ORAL DAILY
Qty: 90 TABLET | Refills: 3 | Status: SHIPPED | OUTPATIENT
Start: 2024-10-24

## 2024-10-24 RX ORDER — CARVEDILOL 3.12 MG/1
3.12 TABLET ORAL 2 TIMES DAILY WITH MEALS
Qty: 180 TABLET | Refills: 3 | Status: SHIPPED | OUTPATIENT
Start: 2024-10-24

## 2024-10-24 RX ORDER — LISINOPRIL AND HYDROCHLOROTHIAZIDE 20; 25 MG/1; MG/1
1 TABLET ORAL
Qty: 90 TABLET | Refills: 3 | Status: SHIPPED | OUTPATIENT
Start: 2024-10-24

## 2024-10-24 ASSESSMENT — 6 MINUTE WALK TEST (6MWT): TOTAL DISTANCE WALKED (METERS): 387

## 2024-10-24 ASSESSMENT — MINNESOTA LIVING WITH HEART FAILURE QUESTIONNAIRE (MLHF)
COSTING YOU MONEY FOR MEDICAL CARE: 5
HAVING TO SIT OR LIE DOWN DURING THE DAY: 0
WORKING AROUND THE HOUSE OR YARD DIFFICULT: 1
MAKING YOU WORRY: 5
FEELING LIKE A BURDEN TO FAMILY AND FRIENDS: 5
DIFFICULTY WORKING TO EARN A LIVING: 0
MAKING YOU SHORT OF BREATH: 5
DIFFICULTY WITH SEXUAL ACTIVITIES: 1
DIFFICULTY SOCIALIZING WITH FAMILY OR FRIENDS: 5
DIFFICULTY SLEEPING WELL AT NIGHT: 0
TIRED, FATIGUED OR LOW ON ENERGY: 0
DIFFICULTY TO CONCENTRATE OR REMEMBERING THINGS: 5
EATING LESS FOODS YOU LIKE: 0
DIFFICULTY WITH RECREATIONAL PASTIMES, SPORTS, HOBBIES: 0
DIFFICULTY GOING AWAY FROM HOME: 5
LOSS OF SELF CONTROL IN YOUR LIFE: 5
MAKING YOU STAY IN A HOSPITAL: 5
TOTAL_SCORE: 58
SWELLING IN ANKLES OR LEGS: 1
MAKING YOU FEEL DEPRESSED: 5
WALKING ABOUT OR CLIMBING STAIRS DIFFICULT: 5
GIVING YOU SIDE EFFECTS FROM TREATMENTS: 0

## 2024-10-24 ASSESSMENT — FIBROSIS 4 INDEX: FIB4 SCORE: 1.20688762076150991

## 2024-10-27 ENCOUNTER — HOSPITAL ENCOUNTER (EMERGENCY)
Facility: MEDICAL CENTER | Age: 64
End: 2024-10-27
Attending: EMERGENCY MEDICINE
Payer: COMMERCIAL

## 2024-10-27 VITALS
BODY MASS INDEX: 30.91 KG/M2 | RESPIRATION RATE: 16 BRPM | WEIGHT: 168 LBS | SYSTOLIC BLOOD PRESSURE: 135 MMHG | HEART RATE: 58 BPM | HEIGHT: 62 IN | OXYGEN SATURATION: 92 % | DIASTOLIC BLOOD PRESSURE: 71 MMHG | TEMPERATURE: 98.6 F

## 2024-10-27 DIAGNOSIS — F12.90 MARIJUANA USE: ICD-10-CM

## 2024-10-27 LAB
AMPHET UR QL SCN: NEGATIVE
BARBITURATES UR QL SCN: NEGATIVE
BENZODIAZ UR QL SCN: NEGATIVE
BZE UR QL SCN: NEGATIVE
CANNABINOIDS UR QL SCN: POSITIVE
EKG IMPRESSION: NORMAL
FENTANYL UR QL: NEGATIVE
METHADONE UR QL SCN: NEGATIVE
OPIATES UR QL SCN: NEGATIVE
OXYCODONE UR QL SCN: NEGATIVE
PCP UR QL SCN: NEGATIVE
PROPOXYPH UR QL SCN: NEGATIVE

## 2024-10-27 PROCEDURE — 93005 ELECTROCARDIOGRAM TRACING: CPT | Performed by: EMERGENCY MEDICINE

## 2024-10-27 PROCEDURE — 93005 ELECTROCARDIOGRAM TRACING: CPT

## 2024-10-27 PROCEDURE — 80307 DRUG TEST PRSMV CHEM ANLYZR: CPT

## 2024-10-27 PROCEDURE — 99284 EMERGENCY DEPT VISIT MOD MDM: CPT

## 2024-10-27 ASSESSMENT — PAIN DESCRIPTION - PAIN TYPE: TYPE: ACUTE PAIN

## 2024-10-27 ASSESSMENT — FIBROSIS 4 INDEX: FIB4 SCORE: 1.20688762076150991

## 2024-10-28 ENCOUNTER — OFFICE VISIT (OUTPATIENT)
Dept: BEHAVIORAL HEALTH | Facility: CLINIC | Age: 64
End: 2024-10-28
Payer: COMMERCIAL

## 2024-10-28 DIAGNOSIS — F41.9 ANXIETY: ICD-10-CM

## 2024-10-28 DIAGNOSIS — F39 MOOD DISORDER (HCC): ICD-10-CM

## 2024-10-28 DIAGNOSIS — F43.10 PTSD (POST-TRAUMATIC STRESS DISORDER): ICD-10-CM

## 2024-10-28 DIAGNOSIS — F41.1 GAD (GENERALIZED ANXIETY DISORDER): ICD-10-CM

## 2024-10-28 PROCEDURE — 90837 PSYTX W PT 60 MINUTES: CPT | Performed by: PSYCHIATRY & NEUROLOGY

## 2024-10-28 RX ORDER — HYDROXYZINE HYDROCHLORIDE 25 MG/1
25 TABLET, FILM COATED ORAL 3 TIMES DAILY PRN
Qty: 30 TABLET | Refills: 0 | Status: SHIPPED | OUTPATIENT
Start: 2024-10-28

## 2024-10-28 RX ORDER — DULOXETIN HYDROCHLORIDE 60 MG/1
60 CAPSULE, DELAYED RELEASE ORAL DAILY
Qty: 30 CAPSULE | Refills: 2 | Status: SHIPPED | OUTPATIENT
Start: 2024-10-28 | End: 2025-01-26

## 2024-11-11 ENCOUNTER — OFFICE VISIT (OUTPATIENT)
Dept: BEHAVIORAL HEALTH | Facility: CLINIC | Age: 64
End: 2024-11-11
Payer: MEDICARE

## 2024-11-11 DIAGNOSIS — F43.10 PTSD (POST-TRAUMATIC STRESS DISORDER): ICD-10-CM

## 2024-11-11 DIAGNOSIS — F41.1 GAD (GENERALIZED ANXIETY DISORDER): ICD-10-CM

## 2024-11-11 DIAGNOSIS — F39 MOOD DISORDER (HCC): ICD-10-CM

## 2024-11-11 PROCEDURE — 90837 PSYTX W PT 60 MINUTES: CPT | Performed by: PSYCHIATRY & NEUROLOGY
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
## 2024-11-13 ENCOUNTER — HOSPITAL ENCOUNTER (EMERGENCY)
Facility: MEDICAL CENTER | Age: 64
End: 2024-11-14
Attending: EMERGENCY MEDICINE
Payer: MEDICARE

## 2024-11-13 DIAGNOSIS — F41.9 ANXIETY: ICD-10-CM

## 2024-11-13 PROCEDURE — 99283 EMERGENCY DEPT VISIT LOW MDM: CPT

## 2024-11-13 ASSESSMENT — FIBROSIS 4 INDEX: FIB4 SCORE: 1.20688762076150991

## 2024-11-14 VITALS
DIASTOLIC BLOOD PRESSURE: 67 MMHG | WEIGHT: 169 LBS | SYSTOLIC BLOOD PRESSURE: 127 MMHG | OXYGEN SATURATION: 95 % | TEMPERATURE: 98 F | HEIGHT: 62 IN | BODY MASS INDEX: 31.1 KG/M2 | RESPIRATION RATE: 18 BRPM | HEART RATE: 58 BPM

## 2024-11-14 PROCEDURE — A9270 NON-COVERED ITEM OR SERVICE: HCPCS | Performed by: EMERGENCY MEDICINE

## 2024-11-14 PROCEDURE — 700102 HCHG RX REV CODE 250 W/ 637 OVERRIDE(OP): Performed by: EMERGENCY MEDICINE

## 2024-11-14 RX ORDER — LORAZEPAM 1 MG/1
1 TABLET ORAL ONCE
Status: COMPLETED | OUTPATIENT
Start: 2024-11-14 | End: 2024-11-14

## 2024-11-14 RX ADMIN — LORAZEPAM 1 MG: 1 TABLET ORAL at 00:03

## 2024-11-14 NOTE — ED TRIAGE NOTES
Chief Complaint   Patient presents with    Other    Psych Eval       Pt to triage ambulatory for above complaint. States she has anxiety and she doesn't know what the other reason is.Patient keeps repeating all her words in triage. Denies any SI/HI, denies any drug use tonight, states she only have patch to quit smoking    Pt back to lobby, educated on triage process and encourage to alert staff of any changes.     Vitals:    11/13/24 2329   BP: 99/75   Pulse: 65   Resp: 16   Temp: 36.6 °C (97.9 °F)   SpO2: 97%

## 2024-11-14 NOTE — ED NOTES
Discharged in stable condition, alert and oriented, ambulatory.follow up appointment instructed. Health education imparted. Instructed to come back once symptoms worsened. Pt verbalized understanding of the information given.     Pt accompanied to lobby and said will wait for her Taxi

## 2024-11-15 NOTE — PROGRESS NOTES
Full therapy note has been documented and is under restricted viewing.  Please see below for summary of today's session.      Patient Name: Violeta Baez  Patient MRN: [id number]  Session Date: 11/11/2024  Resident/Fellow providing service: Humphrey Lomax M.D.     Type of session: Individual psychotherapy  Session start time: 10:30am  Session stop time: 11:30am  Length of time spent face to face with patient: 1 hour  Persons in attendance: Patient     Diagnoses: PTSD, Mood Disorder, EDUARDO     Symptoms currently being addressed in therapy: anxiety symptoms, mood symptoms     Therapeutic Intervention(s): Develop/modify treatment plan, establish rapport and goal-setting.       Medications Reviewed: Yes     Treatment Goal(s)/Objective(s) addressed: Apply treatment strategies     Subjective: Today, we further discussed and applied psychotherapy strategies regarding Mrs. Baez’s goals and experiences. Specific details are documented under psychotherapy note for the session.      Plan:  - Continue weekly therapy.  - Continue Cymbata 60mg per day for mood and anxiety  - Continue hydroxyzine 25mg per day for anxiety  - Continue rapport building and discussion of navigation of interpersonal relationships  - Refer for neuropsychological testing     Discussed with supervising attending, Dr. Del Lomax M.D.

## 2024-11-18 ENCOUNTER — OFFICE VISIT (OUTPATIENT)
Dept: BEHAVIORAL HEALTH | Facility: CLINIC | Age: 64
End: 2024-11-18
Payer: MEDICARE

## 2024-11-18 DIAGNOSIS — F43.10 PTSD (POST-TRAUMATIC STRESS DISORDER): ICD-10-CM

## 2024-11-18 DIAGNOSIS — F41.1 GAD (GENERALIZED ANXIETY DISORDER): ICD-10-CM

## 2024-11-18 DIAGNOSIS — F39 MOOD DISORDER (HCC): ICD-10-CM

## 2024-11-21 ENCOUNTER — TELEPHONE (OUTPATIENT)
Dept: CARDIOLOGY | Facility: MEDICAL CENTER | Age: 64
End: 2024-11-21
Payer: MEDICARE

## 2024-11-21 ENCOUNTER — HOSPITAL ENCOUNTER (EMERGENCY)
Facility: MEDICAL CENTER | Age: 64
End: 2024-11-21
Attending: EMERGENCY MEDICINE
Payer: COMMERCIAL

## 2024-11-21 VITALS
OXYGEN SATURATION: 98 % | SYSTOLIC BLOOD PRESSURE: 127 MMHG | WEIGHT: 158.29 LBS | TEMPERATURE: 98 F | BODY MASS INDEX: 28.95 KG/M2 | DIASTOLIC BLOOD PRESSURE: 71 MMHG | RESPIRATION RATE: 17 BRPM | HEART RATE: 66 BPM

## 2024-11-21 DIAGNOSIS — F41.9 ANXIETY: ICD-10-CM

## 2024-11-21 PROCEDURE — 99283 EMERGENCY DEPT VISIT LOW MDM: CPT

## 2024-11-21 ASSESSMENT — FIBROSIS 4 INDEX: FIB4 SCORE: 1.23
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
## 2024-11-21 NOTE — TELEPHONE ENCOUNTER
Spoke with pt in regards to getting their labs done prior to their next appt with KRISHNA. Pt said they will get the labs done prior to their next appt.

## 2024-11-21 NOTE — ED TRIAGE NOTES
"Chief Complaint   Patient presents with    Generalized Body Aches     BIBA for generalized body aches and \"pain everywhere, I think.\"  Per EMS report, pt does not remember calling them or why she called.      Anxiety     Pt demonstrating anxious behaviors in triage.       Pt states she smoked marijuana today, but not certain when she last used methamphetamines.  Pt states, \"I lavelle to go back into detox.\"  Denies SI HI at this time.  "

## 2024-11-21 NOTE — ED NOTES
Pt discharged home. Pt in possession of belongings. Pt provided discharge education and information pertaining to medications and follow up appointments. Pt received copy of discharge instructions and verbalized understanding. /71   Pulse 66   Temp 36.7 °C (98 °F) (Temporal)   Resp 17   Wt 71.8 kg (158 lb 4.6 oz)   SpO2 98%   BMI 28.95 kg/m²

## 2024-11-22 ENCOUNTER — HOSPITAL ENCOUNTER (OUTPATIENT)
Dept: LAB | Facility: MEDICAL CENTER | Age: 64
End: 2024-11-22
Attending: STUDENT IN AN ORGANIZED HEALTH CARE EDUCATION/TRAINING PROGRAM
Payer: MEDICARE

## 2024-11-22 DIAGNOSIS — I50.32 CHRONIC HEART FAILURE WITH PRESERVED EJECTION FRACTION (HCC): ICD-10-CM

## 2024-11-22 DIAGNOSIS — I50.9 CHF (NYHA CLASS II, ACC/AHA STAGE C) (HCC): ICD-10-CM

## 2024-11-22 DIAGNOSIS — I10 ESSENTIAL HYPERTENSION: ICD-10-CM

## 2024-11-22 LAB
ANION GAP SERPL CALC-SCNC: 11 MMOL/L (ref 7–16)
BUN SERPL-MCNC: 20 MG/DL (ref 8–22)
CALCIUM SERPL-MCNC: 9.1 MG/DL (ref 8.5–10.5)
CHLORIDE SERPL-SCNC: 99 MMOL/L (ref 96–112)
CO2 SERPL-SCNC: 27 MMOL/L (ref 20–33)
CREAT SERPL-MCNC: 1.28 MG/DL (ref 0.5–1.4)
GFR SERPLBLD CREATININE-BSD FMLA CKD-EPI: 46 ML/MIN/1.73 M 2
GLUCOSE SERPL-MCNC: 114 MG/DL (ref 65–99)
POTASSIUM SERPL-SCNC: 4.2 MMOL/L (ref 3.6–5.5)
SODIUM SERPL-SCNC: 137 MMOL/L (ref 135–145)

## 2024-11-22 PROCEDURE — 36415 COLL VENOUS BLD VENIPUNCTURE: CPT

## 2024-11-22 PROCEDURE — 80048 BASIC METABOLIC PNL TOTAL CA: CPT

## 2024-11-22 NOTE — PROGRESS NOTES
Full therapy note has been documented and is under restricted viewing.  Please see below for summary of today's session.      Patient Name: Violeta Baez  Patient MRN: [id number]  Session Date: 11/18/2024  Resident/Fellow providing service: Humphrey Lomax M.D.     Type of session: Individual psychotherapy  Session start time: 10:30am  Session stop time: 11:30am  Length of time spent face to face with patient: 1 hour  Persons in attendance: Patient     Diagnoses: PTSD, Mood Disorder, EDUARDO     Symptoms currently being addressed in therapy: anxiety symptoms, mood symptoms     Therapeutic Intervention(s): Develop/modify treatment plan, establish rapport and goal-setting.       Medications Reviewed: Yes     Treatment Goal(s)/Objective(s) addressed: Apply treatment strategies     Subjective: Today, we further discussed and applied psychotherapy strategies regarding Mrs. Baez’s goals and experiences. Specific details are documented under psychotherapy note for the session.      Plan:  - Continue weekly therapy.  - Continue Cymbata 60mg per day for mood and anxiety  - Continue hydroxyzine 25mg per day for anxiety  - Continue rapport building and discussion of navigation of interpersonal relationships  - Refer for neuropsychological testing     Discussed with supervising attending, Dr. Neelam Lomax M.D.

## 2024-11-25 ENCOUNTER — OFFICE VISIT (OUTPATIENT)
Dept: BEHAVIORAL HEALTH | Facility: CLINIC | Age: 64
End: 2024-11-25
Payer: MEDICARE

## 2024-11-25 DIAGNOSIS — F41.1 GAD (GENERALIZED ANXIETY DISORDER): ICD-10-CM

## 2024-11-25 DIAGNOSIS — F43.10 PTSD (POST-TRAUMATIC STRESS DISORDER): ICD-10-CM

## 2024-11-25 DIAGNOSIS — F39 MOOD DISORDER (HCC): ICD-10-CM

## 2024-11-26 ENCOUNTER — HOSPITAL ENCOUNTER (EMERGENCY)
Facility: MEDICAL CENTER | Age: 64
End: 2024-11-26
Attending: EMERGENCY MEDICINE
Payer: MEDICARE

## 2024-11-26 ENCOUNTER — PHARMACY VISIT (OUTPATIENT)
Dept: PHARMACY | Facility: MEDICAL CENTER | Age: 64
End: 2024-11-26
Payer: COMMERCIAL

## 2024-11-26 VITALS
RESPIRATION RATE: 18 BRPM | TEMPERATURE: 97.8 F | WEIGHT: 162.48 LBS | HEART RATE: 66 BPM | SYSTOLIC BLOOD PRESSURE: 120 MMHG | OXYGEN SATURATION: 93 % | DIASTOLIC BLOOD PRESSURE: 74 MMHG | BODY MASS INDEX: 29.9 KG/M2 | HEIGHT: 62 IN

## 2024-11-26 DIAGNOSIS — F17.200 TOBACCO DEPENDENCE: ICD-10-CM

## 2024-11-26 DIAGNOSIS — F41.9 ANXIETY: ICD-10-CM

## 2024-11-26 PROCEDURE — RXMED WILLOW AMBULATORY MEDICATION CHARGE: Performed by: EMERGENCY MEDICINE

## 2024-11-26 PROCEDURE — A9270 NON-COVERED ITEM OR SERVICE: HCPCS | Performed by: EMERGENCY MEDICINE

## 2024-11-26 PROCEDURE — 700102 HCHG RX REV CODE 250 W/ 637 OVERRIDE(OP): Performed by: EMERGENCY MEDICINE

## 2024-11-26 PROCEDURE — 99284 EMERGENCY DEPT VISIT MOD MDM: CPT

## 2024-11-26 RX ORDER — LORAZEPAM 1 MG/1
1 TABLET ORAL ONCE
Status: COMPLETED | OUTPATIENT
Start: 2024-11-26 | End: 2024-11-26

## 2024-11-26 RX ORDER — NICOTINE 21 MG/24HR
1 PATCH, TRANSDERMAL 24 HOURS TRANSDERMAL EVERY 24 HOURS
Qty: 28 PATCH | Refills: 0 | Status: SHIPPED | OUTPATIENT
Start: 2024-11-26

## 2024-11-26 RX ADMIN — LORAZEPAM 1 MG: 1 TABLET ORAL at 17:58

## 2024-11-26 ASSESSMENT — FIBROSIS 4 INDEX: FIB4 SCORE: 1.23

## 2024-11-26 NOTE — ED TRIAGE NOTES
Chief Complaint   Patient presents with    Body Aches     BIB EMS for generalized body aches. Pt reports putting on a 21mg nicotine patch this afternoon and the aches started shortly after. Denies any other medical complaints.       Patient ambulatory to triage for above complaint. Patient A&Ox4, GCS 15, patient speaking in full sentences. Equal and unlabored respirations. Patient educated on triage process and encouraged to notify staff if condition worsens. Patient returned to the lobby in stable condition.

## 2024-11-27 NOTE — ED NOTES
Discharged home with bus pass. Pt directed to pharmacy for medications. Advised to follow up with pcp.

## 2024-11-27 NOTE — ED PROVIDER NOTES
"ED Provider Note    Primary care provider: Alicia Kramer M.D.    CHIEF COMPLAINT  Chief Complaint   Patient presents with    Body Aches     BIB EMS for generalized body aches. Pt reports putting on a 21mg nicotine patch this afternoon and the aches started shortly after. Denies any other medical complaints.        Limitation to History:  Poor historian    HPI  Violeta Baez is a 64 y.o. female who presents to the Emergency Department to get a refill of her nicotine patch, she also feels extremely anxious, she has a history of PTSD.  She is somewhat rambling, very nervous, no other reasonable history could be obtained at this time.      External Record Review: Patient was in our emergency department on the 21st for generalized bodyaches and anxiety.  History of methamphetamine abuse.  She has been seen here November 13, October 27, [facility] on October 10 pregnancy she presented there with anxiety) does follow-up with the outpatient behavioral health, last seen there in November 18, takes Cymbalta, Atarax.    PAST MEDICAL HISTORY   has a past medical history of Anxiety, Asthma, CHF (congestive heart failure) (HCC), Hashimoto's disease, Hypertension, Hypothyroid, Methamphetamine abuse (HCC), Psychiatric disorder, TBI (traumatic brain injury) (HCC), and Thyroid cancer (HCC).    SURGICAL HISTORY   has a past surgical history that includes thyroidectomy; lumpectomy (Right); and tendon repair (Right).    SOCIAL HISTORY  Social History     Tobacco Use    Smoking status: Every Day     Current packs/day: 1.00     Average packs/day: 1 pack/day for 35.0 years (35.0 ttl pk-yrs)     Types: Cigarettes    Smokeless tobacco: Never   Vaping Use    Vaping status: Some Days    Substances: Nicotine   Substance Use Topics    Alcohol use: Yes     Alcohol/week: 0.6 oz     Types: 1 Shots of liquor per week     Comment: vodka    Drug use: Yes     Frequency: 2.0 times per week     Comment: marijuana & meth\"I don't do them " "anymore\"      Social History     Substance and Sexual Activity   Drug Use Yes    Frequency: 2.0 times per week    Comment: marijuana & meth\"I don't do them anymore\"       PHYSICAL EXAM  VITAL SIGNS: /74   Pulse 66   Temp 36.6 °C (97.8 °F)   Resp 18   Ht 1.575 m (5' 2\")   Wt 73.7 kg (162 lb 7.7 oz)   SpO2 93%   BMI 29.72 kg/m²   Constitutional: Awake, alert in no apparent distress.  HENT: Normocephalic, Bilateral external ears normal. Nose normal.   Eyes: Conjunctiva normal, non-icteric, EOMI.    Thorax & Lungs: Easy unlabored respirations, Clear to ascultation bilaterally.  Cardiovascular: Regular rate, Regular rhythm, No murmurs, rubs or gallops. Bilateral pulses symmetrical.   Abdomen:  Soft, nontender, nondistended, normal active bowel sounds.   :    Skin: Visualized skin is  Dry, No erythema, No rash.   Musculoskeletal:   No cyanosis, clubbing or edema. No leg asymmetry.   Neurologic: Alert, Grossly non-focal.   Psychiatric: Somewhat odd, paranoid, anxious  Lymphatic:          COURSE & MEDICAL DECISION MAKING  Pertinent Labs & Imaging studies reviewed. (See chart for details)    COURSE & MEDICAL DECISION MAKING  Pertinent Labs & Imaging studies reviewed. (See chart for details)    Differential diagnoses include but are not limited to: Amphetamine abuse, anxiety, PTSD    5:28 PM - Nursing notes reviewed, patient seen and examined at bedside.      Escalation of care considered, and ultimately not performed: Laboratory analysis and diagnostic imaging.      Decision Making:  This is a pleasant 64 y.o. year old female who presents with paranoia, anxiety, the patient has been here few times previously, does have history of methamphetamine abuse.  To me she is likely either severely anxious or just suffering the effects of sympathomimetics.  I did give her a dose of Ativan, she called appropriately, I will refill her nicotine patch as well.       The patient was discharged home (see d/c instructions) was " told to return immediately for any signs or symptoms listed, or any worsening at all.  The patient verbally agreed to the discharge precautions and follow-up plan which is documented in EPIC.    Discharge Medications:  Discharge Medication List as of 11/26/2024  6:35 PM          FINAL IMPRESSION  1. Anxiety    2. Tobacco dependence

## 2024-11-27 NOTE — PROGRESS NOTES
Full therapy note has been documented and is under restricted viewing.  Please see below for summary of today's session.      Patient Name: Violeta Baez  Patient MRN: [id number]  Session Date: 11/25/2024  Resident/Fellow providing service: Humphrey Lomax M.D.     Type of session: Individual psychotherapy  Session start time: 1:00pm  Session stop time: 2:00pm  Length of time spent face to face with patient: 1 hour  Persons in attendance: Patient     Diagnoses: PTSD, Mood Disorder, EDUARDO     Symptoms currently being addressed in therapy: anxiety symptoms, mood symptoms     Therapeutic Intervention(s): Develop/modify treatment plan, establish rapport and goal-setting.       Medications Reviewed: Yes     Treatment Goal(s)/Objective(s) addressed: Apply treatment strategies     Subjective: Today, we further discussed and applied psychotherapy strategies regarding Mrs. Baez’s goals and experiences. Specific details are documented under psychotherapy note for the session.      Plan:  - Continue weekly therapy.  - Continue Cymbata 60mg per day for mood and anxiety  - Continue hydroxyzine 25mg per day for anxiety  - Continue rapport building and discussion of navigation of interpersonal relationships  - Refer for neuropsychological testing     Discussed with supervising attending, Dr. Neelam Lomax M.D.

## 2024-11-30 ENCOUNTER — HOSPITAL ENCOUNTER (EMERGENCY)
Facility: MEDICAL CENTER | Age: 64
End: 2024-11-30
Attending: STUDENT IN AN ORGANIZED HEALTH CARE EDUCATION/TRAINING PROGRAM
Payer: MEDICARE

## 2024-11-30 VITALS
HEIGHT: 62 IN | SYSTOLIC BLOOD PRESSURE: 130 MMHG | WEIGHT: 162.48 LBS | HEART RATE: 85 BPM | RESPIRATION RATE: 20 BRPM | BODY MASS INDEX: 29.9 KG/M2 | TEMPERATURE: 98.7 F | OXYGEN SATURATION: 94 % | DIASTOLIC BLOOD PRESSURE: 70 MMHG

## 2024-11-30 DIAGNOSIS — F19.90 DRUG USE: ICD-10-CM

## 2024-11-30 DIAGNOSIS — F41.9 ANXIETY: ICD-10-CM

## 2024-11-30 PROCEDURE — 700102 HCHG RX REV CODE 250 W/ 637 OVERRIDE(OP): Performed by: STUDENT IN AN ORGANIZED HEALTH CARE EDUCATION/TRAINING PROGRAM

## 2024-11-30 PROCEDURE — A9270 NON-COVERED ITEM OR SERVICE: HCPCS | Performed by: STUDENT IN AN ORGANIZED HEALTH CARE EDUCATION/TRAINING PROGRAM

## 2024-11-30 PROCEDURE — 99284 EMERGENCY DEPT VISIT MOD MDM: CPT

## 2024-11-30 RX ORDER — ACETAMINOPHEN 500 MG
1000 TABLET ORAL ONCE
Status: COMPLETED | OUTPATIENT
Start: 2024-11-30 | End: 2024-11-30

## 2024-11-30 RX ORDER — LORAZEPAM 1 MG/1
1 TABLET ORAL ONCE
Status: COMPLETED | OUTPATIENT
Start: 2024-11-30 | End: 2024-11-30

## 2024-11-30 RX ORDER — DULOXETIN HYDROCHLORIDE 60 MG/1
60 CAPSULE, DELAYED RELEASE ORAL ONCE
Status: COMPLETED | OUTPATIENT
Start: 2024-11-30 | End: 2024-11-30

## 2024-11-30 RX ADMIN — DULOXETINE HYDROCHLORIDE 60 MG: 60 CAPSULE, DELAYED RELEASE ORAL at 16:42

## 2024-11-30 RX ADMIN — LORAZEPAM 1 MG: 1 TABLET ORAL at 16:38

## 2024-11-30 RX ADMIN — ACETAMINOPHEN 1000 MG: 500 TABLET ORAL at 16:39

## 2024-11-30 ASSESSMENT — FIBROSIS 4 INDEX: FIB4 SCORE: 1.23

## 2024-12-01 ENCOUNTER — HOSPITAL ENCOUNTER (EMERGENCY)
Facility: MEDICAL CENTER | Age: 64
End: 2024-12-01
Attending: EMERGENCY MEDICINE
Payer: MEDICARE

## 2024-12-01 VITALS
HEIGHT: 66 IN | WEIGHT: 162 LBS | DIASTOLIC BLOOD PRESSURE: 56 MMHG | RESPIRATION RATE: 16 BRPM | TEMPERATURE: 98.5 F | SYSTOLIC BLOOD PRESSURE: 98 MMHG | HEART RATE: 82 BPM | BODY MASS INDEX: 26.03 KG/M2 | OXYGEN SATURATION: 92 %

## 2024-12-01 DIAGNOSIS — F41.9 ANXIETY: ICD-10-CM

## 2024-12-01 PROCEDURE — A9270 NON-COVERED ITEM OR SERVICE: HCPCS | Performed by: EMERGENCY MEDICINE

## 2024-12-01 PROCEDURE — 99283 EMERGENCY DEPT VISIT LOW MDM: CPT

## 2024-12-01 PROCEDURE — 700102 HCHG RX REV CODE 250 W/ 637 OVERRIDE(OP): Performed by: EMERGENCY MEDICINE

## 2024-12-01 RX ORDER — LORAZEPAM 1 MG/1
1 TABLET ORAL ONCE
Status: COMPLETED | OUTPATIENT
Start: 2024-12-01 | End: 2024-12-01

## 2024-12-01 RX ADMIN — LORAZEPAM 1 MG: 1 TABLET ORAL at 19:44

## 2024-12-01 ASSESSMENT — FIBROSIS 4 INDEX: FIB4 SCORE: 1.23

## 2024-12-01 NOTE — ED NOTES
Pt ready for discharge, instructions given, verbalizes understanding  Pt provided bus pass and turkey sandwich.

## 2024-12-01 NOTE — ED TRIAGE NOTES
"Chief Complaint   Patient presents with    Anxiety     Pt BIB EMS for Anxiety. Per EMS pt initaially called them then AMA'd then called them again. Pt arrives very anxious appearing           /71   Pulse 86   Temp 37.2 °C (98.9 °F) (Temporal)   Resp 20   Ht 1.575 m (5' 2.01\")   Wt 73.7 kg (162 lb 7.7 oz)   SpO2 96%   BMI 29.71 kg/m²     "

## 2024-12-01 NOTE — ED PROVIDER NOTES
ED Provider Note    CHIEF COMPLAINT  Chief Complaint   Patient presents with    Anxiety     Pt BIB EMS for Anxiety. Per EMS pt initaially called them then AMA'd then called them again. Pt arrives very anxious appearing       EXTERNAL RECORDS REVIEWED  Outpatient Notes patient was seen in the emergency department 4 days ago with bodyaches.  She was noted to be rambling and nervous.  History of methamphetamine abuse, anxiety, TBI.    HPI/ROS  LIMITATION TO HISTORY   Select: : None      Violeta Baez is a 64 y.o. female who presents to the emergency department for evaluation of anxiety.  The patient reports that she cannot remember why she called EMS today but believes it is because she missed her appointment with her psychiatrist.  She reports that she has chronic anxiety and has been feeling more anxious.  She is requesting Tylenol and medication for anxiety.  She denies suicidal or homicidal ideation.  She denies drug use today.  She denies chest pain, shortness of breath, dizziness, lightheadedness, syncope.    PAST MEDICAL HISTORY   has a past medical history of Anxiety, Asthma, CHF (congestive heart failure) (HCC), Hashimoto's disease, Hypertension, Hypothyroid, Methamphetamine abuse (HCC), Psychiatric disorder, TBI (traumatic brain injury) (HCC), and Thyroid cancer (HCC).    SURGICAL HISTORY   has a past surgical history that includes thyroidectomy; lumpectomy (Right); and tendon repair (Right).    FAMILY HISTORY  Family History   Problem Relation Age of Onset    No Known Problems Mother     Lung Cancer Father     No Known Problems Son     No Known Problems Son        SOCIAL HISTORY  Social History     Tobacco Use    Smoking status: Every Day     Current packs/day: 1.00     Average packs/day: 1 pack/day for 35.0 years (35.0 ttl pk-yrs)     Types: Cigarettes    Smokeless tobacco: Never   Vaping Use    Vaping status: Some Days    Substances: Nicotine   Substance and Sexual Activity    Alcohol use: Yes      "Alcohol/week: 0.6 oz     Types: 1 Shots of liquor per week     Comment: vodka    Drug use: Yes     Frequency: 2.0 times per week     Comment: marijuana & meth\"I don't do them anymore\"    Sexual activity: Yes       CURRENT MEDICATIONS  Home Medications    **Home medications have not yet been reviewed for this encounter**         ALLERGIES  Allergies   Allergen Reactions    Fluoxetine      Irritability, insomnia       PHYSICAL EXAM  VITAL SIGNS: /71   Pulse 86   Temp 37.2 °C (98.9 °F) (Temporal)   Resp 20   Ht 1.575 m (5' 2.01\")   Wt 73.7 kg (162 lb 7.7 oz)   SpO2 96%   BMI 29.71 kg/m²    Constitutional: No acute distress, initially sleeping, awakens easily and then becomes quite anxious, rambling.  HENT: Normocephalic, Atraumatic, Bilateral external ears normal. Nose normal.  Dry mucous membranes  Eyes: Pupils are equal and reactive. Conjunctiva normal, non-icteric.   Heart: Regular rate and rythm,   Lungs: No respiratory distress  GI: Soft nontender nondistended   Musculoskeletal: No obvious deformity. No leg edema.  Skin: Warm, Dry, No erythema, No rash.   Neurologic: Alert and oriented x 3, moving all extremities, ambulates with a steady gait  Psychiatric: Very anxious with rapid pressured speech, no suicidal or homicidal ideation        COURSE & MEDICAL DECISION MAKING    ASSESSMENT, COURSE AND PLAN  Care Narrative:     Patient with a history of anxiety, unspecified psychiatric disorder status post TBI, methamphetamine use presents to the ER today brought in by EMS initially stating that she cannot remember why she called and then endorsing anxiety.  She has some rapid pressured speech and endorses significant anxiety, is very anxious appearing.  Presented similarly to the emergency department a few days ago.  She is also requesting a dose of her medications which she states she has not been taking citing a missed appointment with her psychiatrist.  Will plan to dose her with acetaminophen, low-dose " of Ativan and provide her duloxetine and then reassess for any additional complaints, and ensure improvement in symptomology.    On recheck patient is sleeping.  She awakens easily and is much more calm, linear and thought and no longer with pressured speech.  She states she is feeling better.  She is ambulatory with a steady gait and tolerating p.o.  She is not gravely disabled nor suicidal or homicidal and I feel is stable for discharge.  She was provided with a bus pass and encouraged to follow-up with her primary care doctor.       ADDITIONAL PROBLEMS MANAGED  Medication nonadherence    DISPOSITION AND DISCUSSIONS  I have discussed management of the patient with the following physicians and [facility]'s: None    Discussion of management with other [facility] or appropriate source(s): None     Barriers to care at this time, including but not limited to: Patient lacks financial resources.       FINAL DIAGNOSIS  1. Anxiety    2. Drug use         Electronically signed by: Zeke Cook M.D., 11/30/2024 4:18 PM
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
## 2024-12-02 NOTE — ED NOTES
Discharge instructions reviewed with patient/ family. Patient verbalizes understanding of follow up care, medication management, and reasons to return to ER. Pt provided with bus pass. Pt ambulates to lobby with steady gait.
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
## 2024-12-02 NOTE — ED NOTES
Bedside report received from off going RN: Jennifer, assumed care of patient.  POC discussed with patient. Pt within direct view of RN Station.       Fall risk interventions in place: Place socks on patient, Place fall risk sign on patient's door, and Accompanied to restroom (all applicable per [place] Fall risk assessment)   Continuous monitoring: Pulse Ox or Blood Pressure  IVF/IV medications: Not Applicable   Oxygen: Room Air  Bedside sitter: Not Applicable   Isolation: Not Applicable

## 2024-12-04 ENCOUNTER — HOSPITAL ENCOUNTER (EMERGENCY)
Facility: MEDICAL CENTER | Age: 64
End: 2024-12-04
Attending: EMERGENCY MEDICINE
Payer: MEDICARE

## 2024-12-04 VITALS
OXYGEN SATURATION: 95 % | SYSTOLIC BLOOD PRESSURE: 147 MMHG | HEIGHT: 66 IN | TEMPERATURE: 96.8 F | HEART RATE: 55 BPM | WEIGHT: 162 LBS | RESPIRATION RATE: 17 BRPM | DIASTOLIC BLOOD PRESSURE: 87 MMHG | BODY MASS INDEX: 26.03 KG/M2

## 2024-12-04 DIAGNOSIS — F41.9 ANXIETY: ICD-10-CM

## 2024-12-04 PROCEDURE — A9270 NON-COVERED ITEM OR SERVICE: HCPCS | Performed by: EMERGENCY MEDICINE

## 2024-12-04 PROCEDURE — 700102 HCHG RX REV CODE 250 W/ 637 OVERRIDE(OP): Performed by: EMERGENCY MEDICINE

## 2024-12-04 PROCEDURE — 99283 EMERGENCY DEPT VISIT LOW MDM: CPT

## 2024-12-04 RX ORDER — LORAZEPAM 1 MG/1
1 TABLET ORAL ONCE
Status: COMPLETED | OUTPATIENT
Start: 2024-12-04 | End: 2024-12-04

## 2024-12-04 RX ADMIN — LORAZEPAM 1 MG: 1 TABLET ORAL at 12:05

## 2024-12-04 ASSESSMENT — FIBROSIS 4 INDEX: FIB4 SCORE: 1.23

## 2024-12-04 NOTE — ED NOTES
"Pt endorses symptom relief following mediation. Pt states \"do I get to go home now, I am ready to go home\"   "

## 2024-12-04 NOTE — ED NOTES
Bedside report received from off going RN/tech: Vania LITTLE, assumed care of patient.  POC discussed with patient. Call light within reach, all needs addressed at this time.       Fall risk interventions in place: Not Applicable (all applicable per [place] Fall risk assessment)   Continuous monitoring: Not Applicable   IVF/IV medications: Not Applicable   Oxygen: Room Air  Bedside sitter: Not Applicable   Isolation: Not Applicable

## 2024-12-04 NOTE — ED TRIAGE NOTES
"Chief Complaint   Patient presents with    Anxiety     Pt BIBA from home for anxiety, pt states \"I have a nicotine patch on my arm and I'm worried that I'm all out.\"   Denies SI/HI, ETOH or drug use \"recently.\"        "

## 2024-12-04 NOTE — DISCHARGE INSTRUCTIONS
You were seen in the ER for anxiety. Please follow up with your PCP and/or therapist. Return with new or worsening symptoms.

## 2024-12-04 NOTE — ED PROVIDER NOTES
"ED Provider Note    CHIEF COMPLAINT  Chief Complaint   Patient presents with    Anxiety     Pt BIBA from home for anxiety, pt states \"I have a nicotine patch on my arm and I'm worried that I'm all out.\"   Denies SI/HI, ETOH or drug use \"recently.\"        EXTERNAL RECORDS REVIEWED  Outpatient Notes seen at behavioral health for PTSD most recently 11/25/2024.  She is to continue her Cymbalta, hydroxyzine, and weekly therapy.  and Other patient is seen frequently in this emergency department for anxiety.    HPI/ROS  LIMITATION TO HISTORY   Select: : None  OUTSIDE HISTORIAN(S):  None    Violeta Baez is a 64 y.o. female with a history of anxiety who presents with anxiety due to concern that she is out of her nicotine patches.  She denies any suicidal or homicidal ideation.  She thinks that she may have some patches at home but is not certain and this is causing her anxiety.  She feels as though she would benefit from some oral Ativan.  She has no medical complaints.    PAST MEDICAL HISTORY   has a past medical history of Anxiety, Asthma, CHF (congestive heart failure) (HCC), Hashimoto's disease, Hypertension, Hypothyroid, Methamphetamine abuse (HCC), Psychiatric disorder, TBI (traumatic brain injury) (HCC), and Thyroid cancer (HCC).    SURGICAL HISTORY   has a past surgical history that includes thyroidectomy; lumpectomy (Right); and tendon repair (Right).    FAMILY HISTORY  Family History   Problem Relation Age of Onset    No Known Problems Mother     Lung Cancer Father     No Known Problems Son     No Known Problems Son        SOCIAL HISTORY  Social History     Tobacco Use    Smoking status: Every Day     Current packs/day: 1.00     Average packs/day: 1 pack/day for 35.0 years (35.0 ttl pk-yrs)     Types: Cigarettes    Smokeless tobacco: Never   Vaping Use    Vaping status: Some Days    Substances: Nicotine   Substance and Sexual Activity    Alcohol use: Yes     Alcohol/week: 0.6 oz     Types: 1 Shots of liquor " "per week     Comment: vodka    Drug use: Yes     Frequency: 2.0 times per week     Comment: marijuana & meth\"I don't do them anymore\"    Sexual activity: Yes       CURRENT MEDICATIONS  Home Medications    **Home medications have not yet been reviewed for this encounter**         ALLERGIES  Allergies   Allergen Reactions    Fluoxetine      Irritability, insomnia       PHYSICAL EXAM  VITAL SIGNS: /67   Pulse (!) 56   Temp 35.8 °C (96.5 °F) (Temporal)   Resp 18   Ht 1.676 m (5' 6\")   Wt 73.5 kg (162 lb)   SpO2 94%   BMI 26.15 kg/m²    Physical Exam  Vitals and nursing note reviewed.   Constitutional:       Appearance: Normal appearance.   HENT:      Head: Normocephalic and atraumatic.      Right Ear: External ear normal.      Left Ear: External ear normal.      Nose: Nose normal.      Mouth/Throat:      Mouth: Mucous membranes are moist.      Pharynx: Oropharynx is clear.   Eyes:      Extraocular Movements: Extraocular movements intact.      Conjunctiva/sclera: Conjunctivae normal.      Pupils: Pupils are equal, round, and reactive to light.   Cardiovascular:      Rate and Rhythm: Normal rate.   Pulmonary:      Effort: Pulmonary effort is normal.   Abdominal:      Palpations: Abdomen is soft.      Tenderness: There is no abdominal tenderness.   Musculoskeletal:         General: Normal range of motion.      Cervical back: Normal range of motion and neck supple.   Skin:     General: Skin is warm and dry.   Neurological:      General: No focal deficit present.      Mental Status: She is alert and oriented to person, place, and time.   Psychiatric:      Comments: Anxious.  Cooperative.       EKG/LABS  N/A  I have independently interpreted this EKG    RADIOLOGY/PROCEDURES   My preliminary interpretation is as follows: N/A    Radiologist interpretation:  No orders to display     COURSE & MEDICAL DECISION MAKING    ASSESSMENT, COURSE AND PLAN  Care Narrative: This is a 64-year-old female with a history of " anxiety, frequently seen in this emergency department for anxiety, who is here with a complaint of the same due to her attempts to quit smoking.  She thinks she may have more nicotine patches at home but became worried earlier today because she was uncertain.  She has no suicidal or homicidal ideation and denies any auditory or visual hallucinations.  She denies any recent alcohol or drug use.  She has no current medical complaints other than anxiety.  She thinks that a dose of oral Ativan would be helpful.  Her physical exam is reassuring.  She is mildly bradycardic but afebrile and overall very well-appearing.  I considered lab work and imaging but based on her presentation today do not feel that they are indicated.  She was provided with a dose of oral Ativan.  I encouraged her to take all of her prescribed medications and follow-up with her primary care physician and therapist this week.  Discharged in good and stable condition with strict return precautions.    ADDITIONAL PROBLEMS MANAGED  None    DISPOSITION AND DISCUSSIONS  I have discussed management of the patient with the following physicians and JOSÉ LUIS's: None    Discussion of management with other Q or appropriate source(s): None     Escalation of care considered, and ultimately not performed:Laboratory analysis, diagnostic imaging, and acute inpatient care management, however at this time, the patient is most appropriate for outpatient management    Barriers to care at this time, including but not limited to:  None .     Decision tools and prescription drugs considered including, but not limited to:  N/A .    FINAL DIAGNOSIS  1. Anxiety       Electronically signed by: Johny Mayen M.D., 12/4/2024 11:54 AM

## 2024-12-09 ENCOUNTER — HOSPITAL ENCOUNTER (EMERGENCY)
Facility: MEDICAL CENTER | Age: 64
End: 2024-12-09
Attending: STUDENT IN AN ORGANIZED HEALTH CARE EDUCATION/TRAINING PROGRAM
Payer: MEDICARE

## 2024-12-09 VITALS
SYSTOLIC BLOOD PRESSURE: 128 MMHG | OXYGEN SATURATION: 95 % | HEIGHT: 66 IN | HEART RATE: 71 BPM | DIASTOLIC BLOOD PRESSURE: 64 MMHG | WEIGHT: 160 LBS | BODY MASS INDEX: 25.71 KG/M2 | RESPIRATION RATE: 16 BRPM | TEMPERATURE: 97.3 F

## 2024-12-09 DIAGNOSIS — Z71.1 PHYSICALLY WELL BUT WORRIED: ICD-10-CM

## 2024-12-09 LAB — EKG IMPRESSION: NORMAL

## 2024-12-09 PROCEDURE — 93005 ELECTROCARDIOGRAM TRACING: CPT | Mod: TC | Performed by: STUDENT IN AN ORGANIZED HEALTH CARE EDUCATION/TRAINING PROGRAM

## 2024-12-09 PROCEDURE — 99284 EMERGENCY DEPT VISIT MOD MDM: CPT

## 2024-12-09 ASSESSMENT — FIBROSIS 4 INDEX: FIB4 SCORE: 1.23

## 2024-12-10 NOTE — ED PROVIDER NOTES
ED Provider Note    CHIEF COMPLAINT  Chief Complaint   Patient presents with    Psych Eval     BIB [facility] from home. Initial complaint was chest pain. EKG with [facility] unremarkable.denying chest pain now. Can't recall why she called EMS. Denies ETOH or drugs. Received ibuprofen 600 mg.        EXTERNAL RECORDS REVIEWED  Reviewed outpatient psychiatry note 11/25/2024, evaluated by Dr. Lomax for PTSD, generalized anxiety disorder, mood disorder    HPI/ROS  LIMITATION TO HISTORY   Select: : None  OUTSIDE HISTORIAN(S):  EMS providing clinically relevant collateral history    Violeta Baez is a 64 y.o. female with a past medical history of congestive heart failure, methamphetamine use disorder, traumatic brain injury, unspecified psych disorder, hypertension, hypothyroidism, asthma, anxiety presenting to the emergency department for unclear reason.  According to EMS, patient initially called EMS because of chest discomfort.  Patient denies ever having chest pain today.  According to EMS, she had a reassuring EKG en route.    She denies any SI, HI, hallucinations at this time.  Denies any drug use.  No specific symptoms.    PAST MEDICAL HISTORY   has a past medical history of Anxiety, Asthma, CHF (congestive heart failure) (HCC), Hashimoto's disease, Hypertension, Hypothyroid, Methamphetamine abuse (HCC), Psychiatric disorder, TBI (traumatic brain injury) (HCC), and Thyroid cancer (HCC).    SURGICAL HISTORY   has a past surgical history that includes thyroidectomy; lumpectomy (Right); and tendon repair (Right).    FAMILY HISTORY  Family History   Problem Relation Age of Onset    No Known Problems Mother     Lung Cancer Father     No Known Problems Son     No Known Problems Son        SOCIAL HISTORY  Social History     Tobacco Use    Smoking status: Every Day     Current packs/day: 1.00     Average packs/day: 1 pack/day for 35.0 years (35.0 ttl pk-yrs)     Types: Cigarettes    Smokeless tobacco: Never   Vaping Use     "Vaping status: Some Days    Substances: Nicotine   Substance and Sexual Activity    Alcohol use: Yes     Alcohol/week: 0.6 oz     Types: 1 Shots of liquor per week     Comment: vodka    Drug use: Yes     Frequency: 2.0 times per week     Comment: marijuana & meth\"I don't do them anymore\"    Sexual activity: Yes       CURRENT MEDICATIONS  Home Medications    **Home medications have not yet been reviewed for this encounter**         ALLERGIES  Allergies   Allergen Reactions    Fluoxetine      Irritability, insomnia       PHYSICAL EXAM  VITAL SIGNS: /61   Pulse 60   Temp 36.1 °C (97 °F) (Temporal)   Resp 19   Ht 1.676 m (5' 6\")   Wt 72.6 kg (160 lb)   SpO2 96%   BMI 25.82 kg/m²    General:  non-toxic, no acute distress  Neuro: oriented x 3, moving all extremities.   HEENT:   - Head: Normocephalic, atraumatic  - Eyes: PERRL  - Ears/Nose: normal external nose and ears  - Mouth: Poor dentition, no obvious intraoral infection.  No facial asymmetry.  Resp: clear to auscultation, no increased work of breathing  CV: Regular rate and rhythm  Abd: Soft, non-tender, non-distended  Extremities: No peripheral edema  Psych: lucid and conversational, not endorsing SI, HI, hallucinations        DIAGNOSTIC STUDIES / PROCEDURES    EKG  My independent EKG interpretation:  Results for orders placed or performed during the hospital encounter of 10/27/24   EKG   Result Value Ref Range    Report       [facility] Emergency Dept.    Test Date:  2024-10-27  Pt Name:    VIKI ESTEVEZ             Department: ER  MRN:        [id number]                      Room:       [facility]  Gender:     Female                       Technician: [id number]  :        [id number]                   Requested By:ER TRIAGE PROTOCOL  Order #:    [id number]                    Reading MD: ANGEL GOMEZ MD    Measurements  Intervals                                Axis  Rate:       55                           P:          61  AK:         173  "                         QRS:        10  QRSD:       87                           T:          50  QT:         435  QTc:        416    Interpretive Statements  Sinus bradycardia  Compared to ECG 07/17/2024 15:11:55  No significant changes  Electronically Signed On 10- 10:01:48 PDT by ANGEL GOMEZ MD         LABS  Results for orders placed or performed during the hospital encounter of 11/22/24   Basic Metabolic Panel    Collection Time: 11/22/24  1:51 PM   Result Value Ref Range    Sodium 137 135 - 145 mmol/L    Potassium 4.2 3.6 - 5.5 mmol/L    Chloride 99 96 - 112 mmol/L    Co2 27 20 - 33 mmol/L    Glucose 114 (H) 65 - 99 mg/dL    Bun 20 8 - 22 mg/dL    Creatinine 1.28 0.50 - 1.40 mg/dL    Calcium 9.1 8.5 - 10.5 mg/dL    Anion Gap 11.0 7.0 - 16.0   ESTIMATED GFR    Collection Time: 11/22/24  1:51 PM   Result Value Ref Range    GFR (CKD-EPI) 46 (A) >60 mL/min/1.73 m 2       RADIOLOGY  I have independently interpreted the diagnostic imaging associated with this visit and am waiting the final reading from the radiologist.   My preliminary interpretation is as follows:   -   Radiologist interpretation:   No orders to display           MEDICAL DECISION MAKING      ED COURSE AND PLAN    Violeta Baez is a 64 y.o. female presenting to the emergency department for unclear reasons.  Reportedly patient had called EMS because of chest discomfort but she denied having chest pain to EMS, she denied chest pain on triage and denied any chest pain with me.  Her EKG obtained by EMS was reportedly unremarkable.  I repeated her EKG here in the emergency department and it showed no evidence of acute ischemic changes  Given patient denies ever having chest pain this evening, she has normal vital signs and normal EKG I do not feel that labs are indicated.  Patient is a frequent visitor to this emergency department for various somatic complaints.  She has been here multiple times in the last week.  She is not exhibiting any  decompensation of her mood disorder or generalized anxiety disorder.  Patient is appropriate for discharge home.  She is comfortable going home at this time.  Return precautions discussed      ---Pertinent ED Course---:    10:12 PM I reviewed the patient's old records in Epic, medication list, allergies, past medical history and performed a physical examination.         Procedures:      ----------------------------------------------------------------------------------  DISCUSSIONS    I have discussed management of the patient with the following physicians and JOSÉ LUIS's:      Discussion of management with other [facility] or appropriate source(s):     Escalation of care considered, and ultimately not performed:    Barriers to care at this time, including but not limited to:     Decision tools and prescription drugs considered including, but not limited to:     FINAL IMPRESSION    1. Physically well but worried        New Prescriptions    No medications on file         DISPOSITION    Discharge home, Stable        This chart was dictated using an electronic voice recognition software. The chart has been reviewed and edited but there is still possibility for dictation errors due to limitation of software.    Gerald Fair, DO 12/9/2024

## 2024-12-10 NOTE — ED TRIAGE NOTES
Violeta Cortes [place]  64 y.o. female  Chief Complaint   Patient presents with    Psych Eval     BIB [facility] from home. Initial complaint was chest pain. EKG with [facility] unremarkable.denying chest pain now. Can't recall why she called EMS. Denies ETOH or drugs. Received ibuprofen 600 mg.      Pt ambulatory to triage with steady gait for above complaint.     Pt is GCS 15, speaking in full sentences, follows commands and responds appropriately to questions. Resp are even and unlabored.     Pt placed in ED lobby. Pt educated on triage process. Pt encouraged to alert staff for any changes.       Vitals:    12/09/24 2016   BP: 126/61   Pulse: 60   Resp: 19   Temp: 36.1 °C (97 °F)   SpO2: 96%

## 2024-12-10 NOTE — ED NOTES
Taken patient from triage waiting room, ambulatory with steady gait, alert/ oriented x 4.Verified patient identification. Placed on patient room.Given the call light and instructed to call for any assistance needed/ or concerns.   Bed on lowest position, side rails up, breaks locked. Awaiting for ERP.

## 2024-12-12 DIAGNOSIS — F41.9 ANXIETY: ICD-10-CM

## 2024-12-12 NOTE — TELEPHONE ENCOUNTER
Received request via: Pharmacy     Was the patient seen in the last year in this department? Yes    Does the patient have an active prescription (recently filled or refills available) for medication(s) requested? No    Pharmacy Name: [facility]     Does the patient have [facility] and need 100-day supply? (This applies to ALL medications) Patient does not have SCP

## 2024-12-13 RX ORDER — HYDROXYZINE HYDROCHLORIDE 25 MG/1
25 TABLET, FILM COATED ORAL 3 TIMES DAILY PRN
Qty: 30 TABLET | Refills: 0 | Status: SHIPPED | OUTPATIENT
Start: 2024-12-13 | End: 2024-12-16

## 2024-12-16 ENCOUNTER — OFFICE VISIT (OUTPATIENT)
Dept: BEHAVIORAL HEALTH | Facility: CLINIC | Age: 64
End: 2024-12-16
Payer: MEDICARE

## 2024-12-16 DIAGNOSIS — F41.1 GAD (GENERALIZED ANXIETY DISORDER): ICD-10-CM

## 2024-12-16 DIAGNOSIS — F32.A DEPRESSIVE DISORDER: ICD-10-CM

## 2024-12-16 DIAGNOSIS — F43.10 PTSD (POST-TRAUMATIC STRESS DISORDER): ICD-10-CM

## 2024-12-16 PROCEDURE — 99214 OFFICE O/P EST MOD 30 MIN: CPT | Mod: GC | Performed by: PSYCHIATRY & NEUROLOGY

## 2024-12-16 RX ORDER — DULOXETIN HYDROCHLORIDE 30 MG/1
30 CAPSULE, DELAYED RELEASE ORAL DAILY
Qty: 30 CAPSULE | Refills: 0 | Status: SHIPPED | OUTPATIENT
Start: 2024-12-16

## 2024-12-16 RX ORDER — DULOXETIN HYDROCHLORIDE 20 MG/1
20 CAPSULE, DELAYED RELEASE ORAL DAILY
Qty: 30 CAPSULE | Refills: 2 | Status: CANCELLED | OUTPATIENT
Start: 2024-12-16 | End: 2025-03-16

## 2024-12-16 RX ORDER — HYDROXYZINE HYDROCHLORIDE 25 MG/1
25 TABLET, FILM COATED ORAL 4 TIMES DAILY PRN
Qty: 120 TABLET | Refills: 0 | Status: SHIPPED | OUTPATIENT
Start: 2024-12-16

## 2024-12-16 ASSESSMENT — ENCOUNTER SYMPTOMS
SEIZURES: 0
FEVER: 0
BLOOD IN STOOL: 0

## 2024-12-16 NOTE — PSYCHOTHERAPY
Other hobbies she enjoys:  - She previously used to like going to [employer]  - She likes doing karaoke at the [facility]  - She wants to walk dogs for money  - She liked the [employer] festival  - She texts family members; describes hurt in relationships which may be helpful for further discussion as it is unclear why she has not moved back to [place] with family
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
## 2024-12-23 ENCOUNTER — APPOINTMENT (OUTPATIENT)
Dept: BEHAVIORAL HEALTH | Facility: CLINIC | Age: 64
End: 2024-12-23
Payer: MEDICARE

## 2024-12-30 ENCOUNTER — OFFICE VISIT (OUTPATIENT)
Dept: BEHAVIORAL HEALTH | Facility: CLINIC | Age: 64
End: 2024-12-30
Payer: MEDICARE

## 2024-12-30 DIAGNOSIS — F43.10 PTSD (POST-TRAUMATIC STRESS DISORDER): ICD-10-CM

## 2024-12-30 DIAGNOSIS — F41.1 GAD (GENERALIZED ANXIETY DISORDER): ICD-10-CM

## 2024-12-30 DIAGNOSIS — F39 MOOD DISORDER (HCC): ICD-10-CM

## 2024-12-30 NOTE — PROGRESS NOTES
Full therapy note has been documented and is under restricted viewing.  Please see below for summary of today's session.      Patient Name: Violeta Baez  Patient MRN: [id number]  Session Date: 12/30/2024  Resident/Fellow providing service: Humphrey Lomax M.D.     Type of session: Individual psychotherapy  Session start time: 10:00am  Session stop time: 11:00am  Length of time spent face to face with patient: 1 hour  Persons in attendance: Patient     Diagnoses: PTSD, Mood Disorder, EDUARDO     Symptoms currently being addressed in therapy: anxiety symptoms, mood symptoms     Therapeutic Intervention(s): Develop/modify treatment plan, establish rapport and goal-setting.       Medications Reviewed: Yes     Treatment Goal(s)/Objective(s) addressed: Apply treatment strategies     Subjective: Today, we further discussed and applied psychotherapy strategies regarding Mrs. Baez’s goals and experiences. Specific details are documented under psychotherapy note for the session.      Plan:  - Continue weekly therapy.  - Continue Cymbata 90mg per day for mood and anxiety  - Continue hydroxyzine 25mg as needed up to four times per day for anxiety  - Continue rapport building and discussion of navigation of interpersonal relationships     Discussed with supervising attending, Dr. Omayra Lomax M.D.

## 2024-12-30 NOTE — PSYCHOTHERAPY
We continued to discuss her experiences. We discussed that it sounds like she is lonely, and she described having the TV on 24/7 as she is bored and it sounds like has minimal connection in her life right now.    She reports last time she called 911 that they took her to skilled nursing on Christmas eve and she was released on Christmas, that she has an upcoming court date for this and that she plans to stop calling 911.    We discussed some other possibilities for more sustainable social support, including:  - Alcoholics anonymous; she reports she hung up the directions to walk there on her wall after they were printed out at a previous appointment but has not been going  - Volunteering  - Calling her sons weekly to talk with them  - Doing Karaoke at the [facility] which she enjoys

## 2025-01-02 RX ORDER — LEVOTHYROXINE SODIUM 150 UG/1
150 TABLET ORAL
Qty: 30 TABLET | Refills: 0 | Status: SHIPPED | OUTPATIENT
Start: 2025-01-02

## 2025-01-06 ENCOUNTER — OFFICE VISIT (OUTPATIENT)
Dept: BEHAVIORAL HEALTH | Facility: CLINIC | Age: 65
End: 2025-01-06
Payer: MEDICARE

## 2025-01-06 DIAGNOSIS — F39 MOOD DISORDER (HCC): ICD-10-CM

## 2025-01-06 DIAGNOSIS — F43.10 PTSD (POST-TRAUMATIC STRESS DISORDER): ICD-10-CM

## 2025-01-06 DIAGNOSIS — F41.1 GAD (GENERALIZED ANXIETY DISORDER): ICD-10-CM

## 2025-01-06 PROCEDURE — 90837 PSYTX W PT 60 MINUTES: CPT | Performed by: PSYCHIATRY & NEUROLOGY

## 2025-01-08 NOTE — PROGRESS NOTES
Full therapy note has been documented and is under restricted viewing.  Please see below for summary of today's session.      Patient Name: Violeta Baez  Patient MRN: [id number]  Session Date: 1/06/2025  Resident/Fellow providing service: Humphrey Lomax M.D.     Type of session: Individual psychotherapy  Session start time: 10:00am  Session stop time: 11:00am  Length of time spent face to face with patient: 1 hour  Persons in attendance: Patient     Diagnoses: PTSD, Mood Disorder, EDUARDO     Symptoms currently being addressed in therapy: anxiety symptoms, mood symptoms     Therapeutic Intervention(s): Develop/modify treatment plan, establish rapport and goal-setting.       Medications Reviewed: Yes     Treatment Goal(s)/Objective(s) addressed: Apply treatment strategies     Subjective: Today, we further discussed and applied psychotherapy strategies regarding Mrs. Baez’s goals and experiences. Specific details are documented under psychotherapy note for the session.      Plan:  - Continue weekly therapy.  - Continue Cymbata 90mg per day for mood and anxiety  - Continue hydroxyzine 25mg as needed up to four times per day for anxiety  - Continue rapport building and discussion of navigation of interpersonal relationships     Discussed with supervising attending, Dr. Del Lomax M.D.

## 2025-01-09 ENCOUNTER — OFFICE VISIT (OUTPATIENT)
Dept: CARDIOLOGY | Facility: MEDICAL CENTER | Age: 65
End: 2025-01-09
Attending: PHYSICIAN ASSISTANT
Payer: MEDICARE

## 2025-01-09 VITALS
HEART RATE: 72 BPM | SYSTOLIC BLOOD PRESSURE: 118 MMHG | DIASTOLIC BLOOD PRESSURE: 70 MMHG | OXYGEN SATURATION: 95 % | HEIGHT: 66 IN | WEIGHT: 180.2 LBS | BODY MASS INDEX: 28.96 KG/M2 | RESPIRATION RATE: 16 BRPM

## 2025-01-09 DIAGNOSIS — R06.09 OTHER FORMS OF DYSPNEA: ICD-10-CM

## 2025-01-09 DIAGNOSIS — F17.210 CIGARETTE SMOKER: ICD-10-CM

## 2025-01-09 DIAGNOSIS — I10 ESSENTIAL HYPERTENSION: ICD-10-CM

## 2025-01-09 DIAGNOSIS — I50.32 CHRONIC HEART FAILURE WITH PRESERVED EJECTION FRACTION (HCC): ICD-10-CM

## 2025-01-09 DIAGNOSIS — I50.9 CHF (NYHA CLASS II, ACC/AHA STAGE C) (HCC): ICD-10-CM

## 2025-01-09 PROCEDURE — 99214 OFFICE O/P EST MOD 30 MIN: CPT | Performed by: PHYSICIAN ASSISTANT

## 2025-01-09 PROCEDURE — 3074F SYST BP LT 130 MM HG: CPT | Performed by: PHYSICIAN ASSISTANT

## 2025-01-09 PROCEDURE — 3078F DIAST BP <80 MM HG: CPT | Performed by: PHYSICIAN ASSISTANT

## 2025-01-09 PROCEDURE — 99213 OFFICE O/P EST LOW 20 MIN: CPT | Performed by: PHYSICIAN ASSISTANT

## 2025-01-09 RX ORDER — EMPAGLIFLOZIN 10 MG/1
10 TABLET, FILM COATED ORAL NIGHTLY
Qty: 90 TABLET | Refills: 3 | Status: SHIPPED | OUTPATIENT
Start: 2025-01-09

## 2025-01-09 RX ORDER — EMPAGLIFLOZIN 10 MG/1
10 TABLET, FILM COATED ORAL DAILY
Qty: 90 TABLET | Refills: 3 | Status: SHIPPED | OUTPATIENT
Start: 2025-01-09 | End: 2025-01-09

## 2025-01-09 ASSESSMENT — ENCOUNTER SYMPTOMS
ORTHOPNEA: 0
ABDOMINAL PAIN: 0
DOUBLE VISION: 0
CLAUDICATION: 0
LOSS OF CONSCIOUSNESS: 0
SHORTNESS OF BREATH: 0
FEVER: 0
GASTROINTESTINAL NEGATIVE: 1
BRUISES/BLEEDS EASILY: 0
HEADACHES: 0
COUGH: 0
NERVOUS/ANXIOUS: 1
CHILLS: 0
CONSTITUTIONAL NEGATIVE: 1
PALPITATIONS: 0
VOMITING: 0
EYES NEGATIVE: 1
NEUROLOGICAL NEGATIVE: 1
WEAKNESS: 0
MUSCULOSKELETAL NEGATIVE: 1
PND: 0
NAUSEA: 0
DIZZINESS: 0
MYALGIAS: 0
BLURRED VISION: 0

## 2025-01-09 ASSESSMENT — FIBROSIS 4 INDEX: FIB4 SCORE: 1.23
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
## 2025-01-10 ENCOUNTER — TELEPHONE (OUTPATIENT)
Dept: CARDIOLOGY | Facility: MEDICAL CENTER | Age: 65
End: 2025-01-10
Payer: MEDICARE

## 2025-01-10 NOTE — TELEPHONE ENCOUNTER
Received [employer] request via [employer]  for Jardiance 10 mg . (Quantity:90, Day Supply:90)     Insurance: [email or web address] [facility]  Member ID:  [id number]  BIN: [id number]  PCN: [id number]  Group: [id number]     Ran Test claim via [employer] & medication Pays for a $60 copay. Will outreach to patient to offer specialty pharmacy services and or release to preferred pharmacy

## 2025-01-10 NOTE — PSYCHOTHERAPY
Continued to discuss experiences; she did not answer many of the direct questions this week about things like her favorite TV show and at times was tearful with tears visualized by this interviewer when discussing her past and distress

## 2025-01-13 ENCOUNTER — OFFICE VISIT (OUTPATIENT)
Dept: BEHAVIORAL HEALTH | Facility: CLINIC | Age: 65
End: 2025-01-13
Payer: MEDICARE

## 2025-01-13 ENCOUNTER — TELEPHONE (OUTPATIENT)
Dept: MEDICAL GROUP | Facility: PHYSICIAN GROUP | Age: 65
End: 2025-01-13
Payer: MEDICARE

## 2025-01-13 DIAGNOSIS — F41.1 GAD (GENERALIZED ANXIETY DISORDER): ICD-10-CM

## 2025-01-13 DIAGNOSIS — F39 MOOD DISORDER (HCC): ICD-10-CM

## 2025-01-13 DIAGNOSIS — F43.10 PTSD (POST-TRAUMATIC STRESS DISORDER): ICD-10-CM

## 2025-01-13 DIAGNOSIS — F17.200 TOBACCO DEPENDENCE: ICD-10-CM

## 2025-01-13 RX ORDER — HYDROXYZINE HYDROCHLORIDE 25 MG/1
25 TABLET, FILM COATED ORAL 4 TIMES DAILY PRN
Qty: 120 TABLET | Refills: 0 | Status: SHIPPED | OUTPATIENT
Start: 2025-01-13 | End: 2025-02-19

## 2025-01-13 RX ORDER — NICOTINE 21 MG/24HR
1 PATCH, TRANSDERMAL 24 HOURS TRANSDERMAL EVERY 24 HOURS
Qty: 28 PATCH | Refills: 0 | Status: SHIPPED | OUTPATIENT
Start: 2025-01-13

## 2025-01-13 NOTE — PROGRESS NOTES
Full therapy note has been documented and is under restricted viewing.  Please see below for summary of today's session.      Patient Name: Violeta Baez  Patient MRN: [id number]  Session Date: 1/13/2025  Resident/Fellow providing service: Humphrey Lomax M.D.     Type of session: Individual psychotherapy  Session start time: 10:00am  Session stop time: 11:00am  Length of time spent face to face with patient: 1 hour  Persons in attendance: Patient     Diagnoses: PTSD, Mood Disorder, EDUARDO     Symptoms currently being addressed in therapy: anxiety symptoms, mood symptoms     Therapeutic Intervention(s): Develop/modify treatment plan, establish rapport and goal-setting.       Medications Reviewed: Yes     Treatment Goal(s)/Objective(s) addressed: Apply treatment strategies     Subjective: Today, we further discussed and applied psychotherapy strategies regarding Mrs. Baez’s goals and experiences. Specific details are documented under psychotherapy note for the session.      Plan:  - Continue weekly therapy.  - Continue Cymbata 90mg per day for mood and anxiety  - Continue hydroxyzine 25mg as needed up to four times per day for anxiety  - Continue rapport building and discussion of navigation of interpersonal relationships     Discussed with supervising attending, Dr. Del Lomax M.D.

## 2025-01-13 NOTE — TELEPHONE ENCOUNTER
Patient called to ask for more smoking patches and for a referall sent to podiatrist for her big toe. Please call with questions
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
## 2025-01-13 NOTE — TELEPHONE ENCOUNTER
Received request via: Patient    Was the patient seen in the last year in this department? Yes    Does the patient have an active prescription (recently filled or refills available) for medication(s) requested? No    Pharmacy Name: [id number] - [place], [place] - [address]      Does the patient have half-way Plus and need 100-day supply? (This applies to ALL medications) Patient does not have SCP

## 2025-01-20 ENCOUNTER — OFFICE VISIT (OUTPATIENT)
Dept: BEHAVIORAL HEALTH | Facility: CLINIC | Age: 65
End: 2025-01-20
Payer: MEDICARE

## 2025-01-20 DIAGNOSIS — F41.1 GAD (GENERALIZED ANXIETY DISORDER): ICD-10-CM

## 2025-01-20 DIAGNOSIS — F43.10 PTSD (POST-TRAUMATIC STRESS DISORDER): ICD-10-CM

## 2025-01-20 DIAGNOSIS — F39 MOOD DISORDER (HCC): ICD-10-CM

## 2025-01-20 PROCEDURE — 99214 OFFICE O/P EST MOD 30 MIN: CPT | Mod: GC | Performed by: PSYCHIATRY & NEUROLOGY

## 2025-01-20 RX ORDER — DULOXETIN HYDROCHLORIDE 60 MG/1
60 CAPSULE, DELAYED RELEASE ORAL DAILY
Qty: 30 CAPSULE | Refills: 2 | Status: SHIPPED | OUTPATIENT
Start: 2025-01-20 | End: 2025-01-22

## 2025-01-20 RX ORDER — DULOXETIN HYDROCHLORIDE 30 MG/1
30 CAPSULE, DELAYED RELEASE ORAL DAILY
Qty: 30 CAPSULE | Refills: 0 | Status: SHIPPED | OUTPATIENT
Start: 2025-01-20 | End: 2025-01-22

## 2025-01-20 ASSESSMENT — ENCOUNTER SYMPTOMS
WHEEZING: 0
BLOOD IN STOOL: 0
SEIZURES: 0
PALPITATIONS: 0
FEVER: 0
HALLUCINATIONS: 0
FALLS: 0
BLURRED VISION: 0
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
## 2025-01-22 ENCOUNTER — HOSPITAL ENCOUNTER (EMERGENCY)
Facility: MEDICAL CENTER | Age: 65
End: 2025-01-22
Attending: EMERGENCY MEDICINE
Payer: MEDICARE

## 2025-01-22 VITALS
OXYGEN SATURATION: 94 % | DIASTOLIC BLOOD PRESSURE: 68 MMHG | RESPIRATION RATE: 20 BRPM | BODY MASS INDEX: 29.81 KG/M2 | WEIGHT: 162 LBS | HEIGHT: 62 IN | TEMPERATURE: 98.4 F | SYSTOLIC BLOOD PRESSURE: 122 MMHG | HEART RATE: 62 BPM

## 2025-01-22 DIAGNOSIS — Z76.0 MEDICATION REFILL: ICD-10-CM

## 2025-01-22 DIAGNOSIS — F39 MOOD DISORDER (HCC): ICD-10-CM

## 2025-01-22 DIAGNOSIS — B35.1 ONYCHOMYCOSIS: ICD-10-CM

## 2025-01-22 DIAGNOSIS — F41.1 GAD (GENERALIZED ANXIETY DISORDER): ICD-10-CM

## 2025-01-22 DIAGNOSIS — F43.10 PTSD (POST-TRAUMATIC STRESS DISORDER): ICD-10-CM

## 2025-01-22 DIAGNOSIS — F41.9 ANXIETY: ICD-10-CM

## 2025-01-22 PROCEDURE — 99283 EMERGENCY DEPT VISIT LOW MDM: CPT

## 2025-01-22 PROCEDURE — RXMED WILLOW AMBULATORY MEDICATION CHARGE: Performed by: EMERGENCY MEDICINE

## 2025-01-22 RX ORDER — DULOXETIN HYDROCHLORIDE 60 MG/1
60 CAPSULE, DELAYED RELEASE ORAL DAILY
Qty: 30 CAPSULE | Refills: 2 | Status: SHIPPED | OUTPATIENT
Start: 2025-01-22 | End: 2025-01-22

## 2025-01-22 RX ORDER — DULOXETIN HYDROCHLORIDE 30 MG/1
30 CAPSULE, DELAYED RELEASE ORAL DAILY
Qty: 30 CAPSULE | Refills: 0 | Status: SHIPPED | OUTPATIENT
Start: 2025-01-22

## 2025-01-22 RX ORDER — CICLOPIROX 80 MG/ML
SOLUTION TOPICAL
Qty: 6.6 ML | Refills: 3 | Status: SHIPPED | OUTPATIENT
Start: 2025-01-22

## 2025-01-22 RX ORDER — DULOXETIN HYDROCHLORIDE 60 MG/1
60 CAPSULE, DELAYED RELEASE ORAL DAILY
Qty: 30 CAPSULE | Refills: 0 | Status: SHIPPED | OUTPATIENT
Start: 2025-01-22 | End: 2025-04-22

## 2025-01-22 ASSESSMENT — FIBROSIS 4 INDEX: FIB4 SCORE: 1.23
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
## 2025-01-22 NOTE — ED TRIAGE NOTES
"Chief Complaint   Patient presents with    Psych Eval     Patient states she \"doesn't feel good\" \"can't remember\"     PT BIB [facility] for being off medications and being \"unwell\" patient doesn't know why she is here but states she is sick. She states she has been off her medications and doesn't have them with her and cannot tell me what she takes. Pt Ax0x4. Ambulatory to triage. Pt educated on wait times and triage process.     /66   Pulse (!) 58   Temp 36.7 °C (98.1 °F) (Oral)   Resp 16   Ht 1.575 m (5' 2\")   Wt 73.5 kg (162 lb)   SpO2 95%   BMI 29.63 kg/m²     "

## 2025-01-22 NOTE — ED NOTES
Pt given a sandwich and juice. Pt instructed to  her RXs at the pharmacy upstairs. Pt verbalized understanding. Pt left ambulating independently

## 2025-01-27 ENCOUNTER — HOSPITAL ENCOUNTER (EMERGENCY)
Facility: MEDICAL CENTER | Age: 65
End: 2025-01-27
Attending: EMERGENCY MEDICINE
Payer: MEDICARE

## 2025-01-27 ENCOUNTER — PHARMACY VISIT (OUTPATIENT)
Dept: PHARMACY | Facility: MEDICAL CENTER | Age: 65
End: 2025-01-27
Payer: COMMERCIAL

## 2025-01-27 ENCOUNTER — OFFICE VISIT (OUTPATIENT)
Dept: BEHAVIORAL HEALTH | Facility: CLINIC | Age: 65
End: 2025-01-27
Payer: MEDICARE

## 2025-01-27 VITALS
DIASTOLIC BLOOD PRESSURE: 69 MMHG | OXYGEN SATURATION: 95 % | SYSTOLIC BLOOD PRESSURE: 149 MMHG | HEART RATE: 53 BPM | RESPIRATION RATE: 18 BRPM | BODY MASS INDEX: 29.44 KG/M2 | TEMPERATURE: 96.5 F | HEIGHT: 62 IN | WEIGHT: 160 LBS

## 2025-01-27 VITALS
TEMPERATURE: 97.6 F | WEIGHT: 160 LBS | DIASTOLIC BLOOD PRESSURE: 76 MMHG | HEART RATE: 79 BPM | HEIGHT: 62 IN | SYSTOLIC BLOOD PRESSURE: 141 MMHG | RESPIRATION RATE: 16 BRPM | OXYGEN SATURATION: 92 % | BODY MASS INDEX: 29.44 KG/M2

## 2025-01-27 DIAGNOSIS — F39 MOOD DISORDER (HCC): ICD-10-CM

## 2025-01-27 DIAGNOSIS — N39.0 URINARY TRACT INFECTION WITHOUT HEMATURIA, SITE UNSPECIFIED: ICD-10-CM

## 2025-01-27 DIAGNOSIS — F43.10 PTSD (POST-TRAUMATIC STRESS DISORDER): ICD-10-CM

## 2025-01-27 DIAGNOSIS — R06.00 DYSPNEA, UNSPECIFIED TYPE: ICD-10-CM

## 2025-01-27 DIAGNOSIS — F41.1 GAD (GENERALIZED ANXIETY DISORDER): ICD-10-CM

## 2025-01-27 DIAGNOSIS — F41.9 ANXIETY: ICD-10-CM

## 2025-01-27 LAB
ALBUMIN SERPL BCP-MCNC: 4.1 G/DL (ref 3.2–4.9)
ALBUMIN/GLOB SERPL: 1.5 G/DL
ALP SERPL-CCNC: 81 U/L (ref 30–99)
ALT SERPL-CCNC: 9 U/L (ref 2–50)
AMPHET UR QL SCN: NEGATIVE
ANION GAP SERPL CALC-SCNC: 12 MMOL/L (ref 7–16)
APPEARANCE UR: ABNORMAL
AST SERPL-CCNC: 13 U/L (ref 12–45)
BACTERIA #/AREA URNS HPF: ABNORMAL /HPF
BARBITURATES UR QL SCN: NEGATIVE
BENZODIAZ UR QL SCN: POSITIVE
BILIRUB SERPL-MCNC: 0.5 MG/DL (ref 0.1–1.5)
BILIRUB UR QL STRIP.AUTO: NEGATIVE
BUN SERPL-MCNC: 34 MG/DL (ref 8–22)
BZE UR QL SCN: NEGATIVE
CALCIUM ALBUM COR SERPL-MCNC: 8.7 MG/DL (ref 8.5–10.5)
CALCIUM SERPL-MCNC: 8.8 MG/DL (ref 8.5–10.5)
CANNABINOIDS UR QL SCN: NEGATIVE
CASTS URNS QL MICRO: ABNORMAL /LPF (ref 0–2)
CHLORIDE SERPL-SCNC: 100 MMOL/L (ref 96–112)
CO2 SERPL-SCNC: 23 MMOL/L (ref 20–33)
COLOR UR: YELLOW
CREAT SERPL-MCNC: 0.91 MG/DL (ref 0.5–1.4)
EPITHELIAL CELLS 1715: ABNORMAL /HPF (ref 0–5)
ERYTHROCYTE [DISTWIDTH] IN BLOOD BY AUTOMATED COUNT: 43.3 FL (ref 35.9–50)
FENTANYL UR QL: NEGATIVE
GFR SERPLBLD CREATININE-BSD FMLA CKD-EPI: 70 ML/MIN/1.73 M 2
GLOBULIN SER CALC-MCNC: 2.8 G/DL (ref 1.9–3.5)
GLUCOSE SERPL-MCNC: 95 MG/DL (ref 65–99)
GLUCOSE UR STRIP.AUTO-MCNC: NEGATIVE MG/DL
HCT VFR BLD AUTO: 40 % (ref 37–47)
HGB BLD-MCNC: 13.4 G/DL (ref 12–16)
HYALINE CAST   1831: PRESENT /LPF
KETONES UR STRIP.AUTO-MCNC: NEGATIVE MG/DL
LEUKOCYTE ESTERASE UR QL STRIP.AUTO: ABNORMAL
MCH RBC QN AUTO: 30 PG (ref 27–33)
MCHC RBC AUTO-ENTMCNC: 33.5 G/DL (ref 32.2–35.5)
MCV RBC AUTO: 89.7 FL (ref 81.4–97.8)
METHADONE UR QL SCN: NEGATIVE
MICRO URNS: ABNORMAL
NITRITE UR QL STRIP.AUTO: NEGATIVE
OPIATES UR QL SCN: NEGATIVE
OXYCODONE UR QL SCN: NEGATIVE
PCP UR QL SCN: NEGATIVE
PH UR STRIP.AUTO: 6 [PH] (ref 5–8)
PLATELET # BLD AUTO: 241 K/UL (ref 164–446)
PMV BLD AUTO: 10.4 FL (ref 9–12.9)
POC BREATHALIZER: 0 PERCENT (ref 0–0.01)
POTASSIUM SERPL-SCNC: 4.3 MMOL/L (ref 3.6–5.5)
PROPOXYPH UR QL SCN: NEGATIVE
PROT SERPL-MCNC: 6.9 G/DL (ref 6–8.2)
PROT UR QL STRIP: NEGATIVE MG/DL
RBC # BLD AUTO: 4.46 M/UL (ref 4.2–5.4)
RBC # URNS HPF: ABNORMAL /HPF (ref 0–2)
RBC UR QL AUTO: NEGATIVE
SODIUM SERPL-SCNC: 135 MMOL/L (ref 135–145)
SP GR UR STRIP.AUTO: 1.02
TSH SERPL DL<=0.005 MIU/L-ACNC: 5.09 UIU/ML (ref 0.38–5.33)
UROBILINOGEN UR STRIP.AUTO-MCNC: 0.2 EU/DL
WBC # BLD AUTO: 7.3 K/UL (ref 4.8–10.8)
WBC #/AREA URNS HPF: ABNORMAL /HPF

## 2025-01-27 PROCEDURE — 80053 COMPREHEN METABOLIC PANEL: CPT

## 2025-01-27 PROCEDURE — RXMED WILLOW AMBULATORY MEDICATION CHARGE: Performed by: EMERGENCY MEDICINE

## 2025-01-27 PROCEDURE — 84443 ASSAY THYROID STIM HORMONE: CPT

## 2025-01-27 PROCEDURE — 87077 CULTURE AEROBIC IDENTIFY: CPT

## 2025-01-27 PROCEDURE — 99284 EMERGENCY DEPT VISIT MOD MDM: CPT | Mod: 27

## 2025-01-27 PROCEDURE — 302970 POC BREATHALIZER: Performed by: EMERGENCY MEDICINE

## 2025-01-27 PROCEDURE — 99284 EMERGENCY DEPT VISIT MOD MDM: CPT | Mod: 25

## 2025-01-27 PROCEDURE — 81001 URINALYSIS AUTO W/SCOPE: CPT | Mod: XU

## 2025-01-27 PROCEDURE — 80307 DRUG TEST PRSMV CHEM ANLYZR: CPT

## 2025-01-27 PROCEDURE — A9270 NON-COVERED ITEM OR SERVICE: HCPCS | Performed by: EMERGENCY MEDICINE

## 2025-01-27 PROCEDURE — 36415 COLL VENOUS BLD VENIPUNCTURE: CPT

## 2025-01-27 PROCEDURE — RXOTC WILLOW AMBULATORY OTC CHARGE

## 2025-01-27 PROCEDURE — 87086 URINE CULTURE/COLONY COUNT: CPT

## 2025-01-27 PROCEDURE — 85027 COMPLETE CBC AUTOMATED: CPT

## 2025-01-27 PROCEDURE — 99999 PR NO CHARGE: CPT | Performed by: PSYCHIATRY & NEUROLOGY

## 2025-01-27 PROCEDURE — 700102 HCHG RX REV CODE 250 W/ 637 OVERRIDE(OP): Performed by: EMERGENCY MEDICINE

## 2025-01-27 RX ORDER — DIAZEPAM 5 MG/1
5 TABLET ORAL EVERY 6 HOURS PRN
Status: COMPLETED | OUTPATIENT
Start: 2025-01-27 | End: 2025-01-27

## 2025-01-27 RX ORDER — SULFAMETHOXAZOLE AND TRIMETHOPRIM 800; 160 MG/1; MG/1
1 TABLET ORAL 2 TIMES DAILY
Qty: 20 TABLET | Refills: 0 | Status: ACTIVE | OUTPATIENT
Start: 2025-01-27 | End: 2025-01-27

## 2025-01-27 RX ORDER — SULFAMETHOXAZOLE AND TRIMETHOPRIM 800; 160 MG/1; MG/1
1 TABLET ORAL 2 TIMES DAILY
Qty: 20 TABLET | Refills: 0 | Status: ACTIVE | OUTPATIENT
Start: 2025-01-27 | End: 2025-02-24

## 2025-01-27 RX ADMIN — DIAZEPAM 5 MG: 5 TABLET ORAL at 05:00

## 2025-01-27 ASSESSMENT — FIBROSIS 4 INDEX
FIB4 SCORE: 1.17
FIB4 SCORE: 1.23

## 2025-01-27 ASSESSMENT — PAIN DESCRIPTION - PAIN TYPE: TYPE: ACUTE PAIN
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
## 2025-01-27 NOTE — ED TRIAGE NOTES
"Chief Complaint   Patient presents with    Shortness of Breath     BIBA Patient is c/o of SOB and chest pain. Patient has nicotine patch to left upper arm. Patient is very repetitive and anxious. Patinet is concerned about trying to quit smoking.    Psych Eval       Patient states \"she has a patch on\" and \"I have more patches at home\". Per EMS, patient has hx of TBI  "

## 2025-01-27 NOTE — ED NOTES
Patient discharged home per ERP.  Discharge teaching and education discussed with patient. POC discussed.   Patient verbalized understanding of discharge teaching and education. No other questions at this time.     VSS. Patient alert and oriented. Patient arranged ride for self. Able to ambulate off unit safely with steady gait.    Social work gave patient bus pass.

## 2025-01-27 NOTE — ED PROVIDER NOTES
ED Provider Note    CHIEF COMPLAINT  Chief Complaint   Patient presents with    Shortness of Breath     BIBA Patient is c/o of SOB and chest pain. Patient has nicotine patch to left upper arm. Patient is very repetitive and anxious. Rosynet is concerned about trying to quit smoking.    Psych Eval       EXTERNAL RECORDS REVIEWED  Other reviewed several past drug screens that were negative for amphetamines    HPI/ROS    Violeta Baez is a 64 y.o. female who presents feeling short of breath.  The patient says she called EMS that she is having some chest pain and shortness of breath prior to arrival.  She presents extremely anxious and repetitive.  She states she has a nicotine patch on and she is trying to quit smoking.  She has not any recent vomiting or diarrhea.  The patient admits to marijuana use but denies otherwise recreational drug abuse.  She does take Synthroid supplementation with no recent changes in her medication.    PAST MEDICAL HISTORY   has a past medical history of Anxiety, Asthma, CHF (congestive heart failure) (HCC), Hashimoto's disease, Hypertension, Hypothyroid, Methamphetamine abuse (HCC), Psychiatric disorder, TBI (traumatic brain injury) (HCC), and Thyroid cancer (HCC).    SURGICAL HISTORY   has a past surgical history that includes thyroidectomy; lumpectomy (Right); and tendon repair (Right).    FAMILY HISTORY  Family History   Problem Relation Age of Onset    No Known Problems Mother     Lung Cancer Father     No Known Problems Son     No Known Problems Son        SOCIAL HISTORY  Social History     Tobacco Use    Smoking status: Every Day     Current packs/day: 1.00     Average packs/day: 1 pack/day for 35.0 years (35.0 ttl pk-yrs)     Types: Cigarettes    Smokeless tobacco: Never   Vaping Use    Vaping status: Some Days    Substances: Nicotine   Substance and Sexual Activity    Alcohol use: Yes     Alcohol/week: 0.6 oz     Types: 1 Shots of liquor per week     Comment: vodka    Drug  "use: Yes     Frequency: 2.0 times per week     Comment: marijuana & meth\"I don't do them anymore\"    Sexual activity: Yes       CURRENT MEDICATIONS  Home Medications       Reviewed by Kirsty Guerrero R.N. (Registered Nurse) on 01/27/25 at 0438  Med List Status: Not Addressed     Medication Last Dose Status   albuterol 108 (90 Base) MCG/ACT Aero Soln inhalation aerosol  Active   carvedilol (COREG) 3.125 MG Tab  Active   ciclopirox (PENLAC) 8 % solution  Active   DULoxetine (CYMBALTA) 30 MG Cap DR Particles  Active   DULoxetine (CYMBALTA) 60 MG Cap DR Particles delayed-release capsule  Active   Empagliflozin (JARDIANCE) 10 MG Tab tablet  Active   hydrOXYzine HCl (ATARAX) 25 MG Tab  Active   levothyroxine (SYNTHROID) 150 MCG Tab  Active   lisinopril-hydrochlorothiazide (PRINZIDE) 20-25 MG per tablet  Active   nicotine (NICODERM) 21 MG/24HR PATCH 24 HR  Active   spironolactone (ALDACTONE) 25 MG Tab  Active   Vitamin D, Ergocalciferol, 13363 units Cap  Active                    ALLERGIES  Allergies   Allergen Reactions    Fluoxetine      Irritability, insomnia       PHYSICAL EXAM  VITAL SIGNS: BP (!) 149/74   Pulse 60   Temp 36.7 °C (98.1 °F) (Temporal)   Resp 18   Ht 1.575 m (5' 2\")   Wt 72.6 kg (160 lb)   SpO2 96%   BMI 29.26 kg/m²    In general the patient is extremely anxious    HEENT unremarkable    Pulmonary the patient's lungs are clear to auscultation bilaterally    Cardiovascular S1-S2 with a regular rate and rhythm    GI abdomen soft    Skin no rashes, pallor, no jaundice    Extremities no distal edema    Neurologic examination is grossly intact    EKG/LABS  Results for orders placed or performed during the hospital encounter of 01/27/25   CBC WITHOUT DIFFERENTIAL    Collection Time: 01/27/25  5:21 AM   Result Value Ref Range    WBC 7.3 4.8 - 10.8 K/uL    RBC 4.46 4.20 - 5.40 M/uL    Hemoglobin 13.4 12.0 - 16.0 g/dL    Hematocrit 40.0 37.0 - 47.0 %    MCV 89.7 81.4 - 97.8 fL    MCH 30.0 27.0 - 33.0 " pg    MCHC 33.5 32.2 - 35.5 g/dL    RDW 43.3 35.9 - 50.0 fL    Platelet Count 241 164 - 446 K/uL    MPV 10.4 9.0 - 12.9 fL   Comp Metabolic Panel    Collection Time: 01/27/25  5:21 AM   Result Value Ref Range    Sodium 135 135 - 145 mmol/L    Potassium 4.3 3.6 - 5.5 mmol/L    Chloride 100 96 - 112 mmol/L    Co2 23 20 - 33 mmol/L    Anion Gap 12.0 7.0 - 16.0    Glucose 95 65 - 99 mg/dL    Bun 34 (H) 8 - 22 mg/dL    Creatinine 0.91 0.50 - 1.40 mg/dL    Calcium 8.8 8.5 - 10.5 mg/dL    Correct Calcium 8.7 8.5 - 10.5 mg/dL    AST(SGOT) 13 12 - 45 U/L    ALT(SGPT) 9 2 - 50 U/L    Alkaline Phosphatase 81 30 - 99 U/L    Total Bilirubin 0.5 0.1 - 1.5 mg/dL    Albumin 4.1 3.2 - 4.9 g/dL    Total Protein 6.9 6.0 - 8.2 g/dL    Globulin 2.8 1.9 - 3.5 g/dL    A-G Ratio 1.5 g/dL   TSH WITH REFLEX TO FT4    Collection Time: 01/27/25  5:21 AM   Result Value Ref Range    TSH 5.090 0.380 - 5.330 uIU/mL   ESTIMATED GFR    Collection Time: 01/27/25  5:21 AM   Result Value Ref Range    GFR (CKD-EPI) 70 >60 mL/min/1.73 m 2     *Note: Due to a large number of results and/or encounters for the requested time period, some results have not been displayed. A complete set of results can be found in Results Review.         COURSE & MEDICAL DECISION MAKING    This a 64-year-old female who presents the Emergency Department with shortness of breath.  The patient presents acutely anxious in appearance.  She does appear to have some underlying psychosis.  I did review drug screens as mentioned above and the patient was only positive for marijuana with no amphetamines.  In reviewing past history is also noted that she is taking supplemental thyroid medication I did check a TSH to make sure she is not overtreated causing some thyrotoxicosis and this was negative.  The patient's lungs are clear and she does not show any evidence of overt heart failure.  She did respond to some Valium and my suspicion is this is all from anxiety.  The patient states she  has good follow-up with her primary care provider.  Therefore she will be discharged.  She did have significant improvement on the Valium and she is resting comfortably at the time of discharge.    FINAL DIAGNOSIS  1.  Dyspnea  2.  Anxiety    Disposition  Patient will be discharged in stable condition     Electronically signed by: Phillip Toth M.D., 1/27/2025 4:48 AM

## 2025-01-27 NOTE — ED TRIAGE NOTES
Chief Complaint   Patient presents with    Anxiety     Pt YEN DOMINGUEZ from home. Pt seen here this morning for the same. Dc'd at 0630 this morning. Per ALBERTO pt called 911 a few different times this morning, did not go to the hospital the last time, however pt stated she would continue to call 911 if they left.      Pt arrives via EMS for above.     Pt placed on monitoring, chart up for ERP

## 2025-01-27 NOTE — ED NOTES
Breathalyzer completed, pt ambulated to bathroom with a steady gait for urine sample. Only 1 yellow tube able to be collected and sent.

## 2025-01-28 NOTE — DISCHARGE INSTRUCTIONS
You have a urinary tract infection.  Please get the prescription filled and take that.    Please get the prescriptions you are prescribed earlier today get them filled and start taking them.

## 2025-01-28 NOTE — ED NOTES
Pt cleared for discharge, VSS, pt A&Ox4, ambulatory with a steady gait. Pt discharged home with written and verbal instructions regarding f/u, activity, reasons to return to ED & RX to  at preferred pharmacy. Verbalized understanding, all questions addressed, ambulated out with a steady gait with all belongings. Directions provided to pharmacy

## 2025-01-29 LAB
BACTERIA UR CULT: NORMAL
SIGNIFICANT IND 70042: NORMAL
SITE SITE: NORMAL
SOURCE SOURCE: NORMAL

## 2025-02-03 ENCOUNTER — HOSPITAL ENCOUNTER (EMERGENCY)
Facility: MEDICAL CENTER | Age: 65
End: 2025-02-03
Attending: EMERGENCY MEDICINE
Payer: MEDICARE

## 2025-02-03 ENCOUNTER — OFFICE VISIT (OUTPATIENT)
Dept: BEHAVIORAL HEALTH | Facility: CLINIC | Age: 65
End: 2025-02-03
Payer: MEDICARE

## 2025-02-03 VITALS
HEIGHT: 62 IN | RESPIRATION RATE: 17 BRPM | SYSTOLIC BLOOD PRESSURE: 124 MMHG | TEMPERATURE: 97.2 F | OXYGEN SATURATION: 92 % | HEART RATE: 62 BPM | WEIGHT: 160 LBS | BODY MASS INDEX: 29.44 KG/M2 | DIASTOLIC BLOOD PRESSURE: 61 MMHG

## 2025-02-03 DIAGNOSIS — F17.200 SMOKER: ICD-10-CM

## 2025-02-03 DIAGNOSIS — F39 MOOD DISORDER (HCC): ICD-10-CM

## 2025-02-03 DIAGNOSIS — Z71.6 ENCOUNTER FOR SMOKING CESSATION COUNSELING: ICD-10-CM

## 2025-02-03 DIAGNOSIS — R52 BODY ACHES: ICD-10-CM

## 2025-02-03 DIAGNOSIS — F43.10 PTSD (POST-TRAUMATIC STRESS DISORDER): ICD-10-CM

## 2025-02-03 DIAGNOSIS — F41.1 GAD (GENERALIZED ANXIETY DISORDER): ICD-10-CM

## 2025-02-03 LAB — GLUCOSE BLD STRIP.AUTO-MCNC: 130 MG/DL (ref 65–99)

## 2025-02-03 PROCEDURE — 82962 GLUCOSE BLOOD TEST: CPT

## 2025-02-03 PROCEDURE — 99283 EMERGENCY DEPT VISIT LOW MDM: CPT

## 2025-02-03 ASSESSMENT — FIBROSIS 4 INDEX: FIB4 SCORE: 1.17

## 2025-02-03 NOTE — PROGRESS NOTES
Full therapy note has been documented and is under restricted viewing.  Please see below for summary of today's session.      Patient Name: Violeta Baez  Patient MRN: [id number]  Session Date: 2/3/2025  Resident/Fellow providing service: Humphrey Lomax M.D.     Type of session: Individual psychotherapy  Session start time: 10:00am  Session stop time: 11:00am  Length of time spent face to face with patient: 1 hour  Persons in attendance: Patient     Diagnoses: PTSD, Mood Disorder, EDUARDO     Symptoms currently being addressed in therapy: anxiety symptoms, mood symptoms     Therapeutic Intervention(s): Develop/modify treatment plan, establish rapport and goal-setting.       Medications Reviewed: Yes     Treatment Goal(s)/Objective(s) addressed: Apply treatment strategies     Subjective: Today, we further discussed and applied psychotherapy strategies regarding Mrs. Baez’s goals and experiences. Specific details are documented under psychotherapy note for the session.      Plan:  - Continue weekly therapy.  - Continue Cymbata 90mg per day for mood and anxiety  - Continue hydroxyzine 25mg as needed up to four times per day for anxiety  - Continue rapport building and discussion of navigation of interpersonal relationships  - Referral to neuropsychological testing     Discussed with supervising attending, Dr. Omayra Lomax M.D.

## 2025-02-03 NOTE — PSYCHOTHERAPY
Continued to discuss experiences. She describes wanting to move to [place] to be closer to family. She reports she did go to the [facility] last week on Thursday and sang [employer] [facility] which she enjoyed. She continues to state that she doesn't know why she calls 911.
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
## 2025-02-03 NOTE — DISCHARGE INSTRUCTIONS
We recommend close follow-up with a primary care doctor.  Return to the emergency department as needed.

## 2025-02-03 NOTE — PROGRESS NOTES
Full therapy note has been documented and is under restricted viewing.  Please see below for summary of today's session.      Patient Name: Violeta Baez  Patient MRN: [id number]  Session Date: 1/27/2025  Resident/Fellow providing service: Humphrey Lomax M.D.     Type of session: Individual psychotherapy  Session start time: 10:00am  Session stop time: 11:00am  Length of time spent face to face with patient: 1 hour  Persons in attendance: Patient     Diagnoses: PTSD, Mood Disorder, EDUARDO     Symptoms currently being addressed in therapy: anxiety symptoms, mood symptoms     Therapeutic Intervention(s): Develop/modify treatment plan, establish rapport and goal-setting.       Medications Reviewed: Yes     Treatment Goal(s)/Objective(s) addressed: Apply treatment strategies     Subjective: Today, we further discussed and applied psychotherapy strategies regarding Mrs. Baez’s goals and experiences. Specific details are documented under psychotherapy note for the session.      Plan:  - Continue weekly therapy.  - Continue Cymbata 90mg per day for mood and anxiety  - Continue hydroxyzine 25mg as needed up to four times per day for anxiety  - Continue rapport building and discussion of navigation of interpersonal relationships     Discussed with supervising attending, Dr. Omayra Lomax M.D.

## 2025-02-03 NOTE — ED NOTES
Discharged in stable condition, alert and oriented, ambulatory. follow up appointment instructed. Health education imparted. Instructed to come back once symptoms worsened. Pt verbalized understanding of the information given.     Accompanied pt to lobby' pt called cab company self pay and said they will  the pt n 30 minutes. Pt will wait in the lobby
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
## 2025-02-14 ENCOUNTER — HOSPITAL ENCOUNTER (EMERGENCY)
Facility: MEDICAL CENTER | Age: 65
End: 2025-02-14
Attending: EMERGENCY MEDICINE
Payer: MEDICARE

## 2025-02-14 ENCOUNTER — APPOINTMENT (OUTPATIENT)
Dept: RADIOLOGY | Facility: MEDICAL CENTER | Age: 65
End: 2025-02-14
Attending: EMERGENCY MEDICINE
Payer: MEDICARE

## 2025-02-14 VITALS
HEART RATE: 61 BPM | WEIGHT: 172.18 LBS | HEIGHT: 62 IN | BODY MASS INDEX: 31.68 KG/M2 | SYSTOLIC BLOOD PRESSURE: 126 MMHG | OXYGEN SATURATION: 97 % | TEMPERATURE: 97.9 F | DIASTOLIC BLOOD PRESSURE: 71 MMHG | RESPIRATION RATE: 16 BRPM

## 2025-02-14 DIAGNOSIS — R07.9 CHEST PAIN, UNSPECIFIED TYPE: ICD-10-CM

## 2025-02-14 DIAGNOSIS — F15.10 METHAMPHETAMINE USE (HCC): ICD-10-CM

## 2025-02-14 DIAGNOSIS — F41.8 SITUATIONAL ANXIETY: ICD-10-CM

## 2025-02-14 LAB
ALBUMIN SERPL BCP-MCNC: 3.8 G/DL (ref 3.2–4.9)
ALBUMIN/GLOB SERPL: 1.3 G/DL
ALP SERPL-CCNC: 78 U/L (ref 30–99)
ALT SERPL-CCNC: 15 U/L (ref 2–50)
ANION GAP SERPL CALC-SCNC: 9 MMOL/L (ref 7–16)
AST SERPL-CCNC: 18 U/L (ref 12–45)
BASOPHILS # BLD AUTO: 0.8 % (ref 0–1.8)
BASOPHILS # BLD: 0.06 K/UL (ref 0–0.12)
BILIRUB SERPL-MCNC: 0.3 MG/DL (ref 0.1–1.5)
BUN SERPL-MCNC: 50 MG/DL (ref 8–22)
CALCIUM ALBUM COR SERPL-MCNC: 9.3 MG/DL (ref 8.5–10.5)
CALCIUM SERPL-MCNC: 9.1 MG/DL (ref 8.5–10.5)
CHLORIDE SERPL-SCNC: 105 MMOL/L (ref 96–112)
CO2 SERPL-SCNC: 22 MMOL/L (ref 20–33)
CREAT SERPL-MCNC: 1.27 MG/DL (ref 0.5–1.4)
EKG IMPRESSION: NORMAL
EOSINOPHIL # BLD AUTO: 0.42 K/UL (ref 0–0.51)
EOSINOPHIL NFR BLD: 5.4 % (ref 0–6.9)
ERYTHROCYTE [DISTWIDTH] IN BLOOD BY AUTOMATED COUNT: 46.2 FL (ref 35.9–50)
GFR SERPLBLD CREATININE-BSD FMLA CKD-EPI: 47 ML/MIN/1.73 M 2
GLOBULIN SER CALC-MCNC: 3 G/DL (ref 1.9–3.5)
GLUCOSE SERPL-MCNC: 89 MG/DL (ref 65–99)
HCT VFR BLD AUTO: 39.8 % (ref 37–47)
HGB BLD-MCNC: 13.3 G/DL (ref 12–16)
IMM GRANULOCYTES # BLD AUTO: 0.02 K/UL (ref 0–0.11)
IMM GRANULOCYTES NFR BLD AUTO: 0.3 % (ref 0–0.9)
LYMPHOCYTES # BLD AUTO: 2.85 K/UL (ref 1–4.8)
LYMPHOCYTES NFR BLD: 36.3 % (ref 22–41)
MCH RBC QN AUTO: 30.2 PG (ref 27–33)
MCHC RBC AUTO-ENTMCNC: 33.4 G/DL (ref 32.2–35.5)
MCV RBC AUTO: 90.2 FL (ref 81.4–97.8)
MONOCYTES # BLD AUTO: 0.51 K/UL (ref 0–0.85)
MONOCYTES NFR BLD AUTO: 6.5 % (ref 0–13.4)
NEUTROPHILS # BLD AUTO: 3.99 K/UL (ref 1.82–7.42)
NEUTROPHILS NFR BLD: 50.7 % (ref 44–72)
NRBC # BLD AUTO: 0 K/UL
NRBC BLD-RTO: 0 /100 WBC (ref 0–0.2)
NT-PROBNP SERPL IA-MCNC: 62 PG/ML (ref 0–125)
PLATELET # BLD AUTO: 279 K/UL (ref 164–446)
PMV BLD AUTO: 9.9 FL (ref 9–12.9)
POTASSIUM SERPL-SCNC: 5.2 MMOL/L (ref 3.6–5.5)
PROT SERPL-MCNC: 6.8 G/DL (ref 6–8.2)
RBC # BLD AUTO: 4.41 M/UL (ref 4.2–5.4)
SODIUM SERPL-SCNC: 136 MMOL/L (ref 135–145)
TROPONIN T SERPL-MCNC: 14 NG/L (ref 6–19)
WBC # BLD AUTO: 7.9 K/UL (ref 4.8–10.8)

## 2025-02-14 PROCEDURE — 85025 COMPLETE CBC W/AUTO DIFF WBC: CPT

## 2025-02-14 PROCEDURE — 80053 COMPREHEN METABOLIC PANEL: CPT

## 2025-02-14 PROCEDURE — 36415 COLL VENOUS BLD VENIPUNCTURE: CPT

## 2025-02-14 PROCEDURE — A9270 NON-COVERED ITEM OR SERVICE: HCPCS | Performed by: EMERGENCY MEDICINE

## 2025-02-14 PROCEDURE — 93005 ELECTROCARDIOGRAM TRACING: CPT | Mod: TC

## 2025-02-14 PROCEDURE — 700102 HCHG RX REV CODE 250 W/ 637 OVERRIDE(OP): Performed by: EMERGENCY MEDICINE

## 2025-02-14 PROCEDURE — 84484 ASSAY OF TROPONIN QUANT: CPT

## 2025-02-14 PROCEDURE — 93005 ELECTROCARDIOGRAM TRACING: CPT | Mod: TC | Performed by: EMERGENCY MEDICINE

## 2025-02-14 PROCEDURE — 99284 EMERGENCY DEPT VISIT MOD MDM: CPT

## 2025-02-14 PROCEDURE — 83880 ASSAY OF NATRIURETIC PEPTIDE: CPT

## 2025-02-14 PROCEDURE — 71045 X-RAY EXAM CHEST 1 VIEW: CPT

## 2025-02-14 RX ORDER — NICOTINE 21 MG/24HR
1 PATCH, TRANSDERMAL 24 HOURS TRANSDERMAL ONCE
Status: DISCONTINUED | OUTPATIENT
Start: 2025-02-14 | End: 2025-02-14 | Stop reason: HOSPADM

## 2025-02-14 RX ORDER — HYDROXYZINE HYDROCHLORIDE 25 MG/1
25 TABLET, FILM COATED ORAL ONCE
Status: COMPLETED | OUTPATIENT
Start: 2025-02-14 | End: 2025-02-14

## 2025-02-14 RX ADMIN — NICOTINE TRANSDERMAL SYSTEM 21 MG: 21 PATCH, EXTENDED RELEASE TRANSDERMAL at 17:42

## 2025-02-14 RX ADMIN — HYDROXYZINE HYDROCHLORIDE 25 MG: 25 TABLET, FILM COATED ORAL at 17:42

## 2025-02-14 ASSESSMENT — PAIN DESCRIPTION - PAIN TYPE: TYPE: ACUTE PAIN

## 2025-02-14 ASSESSMENT — FIBROSIS 4 INDEX: FIB4 SCORE: 1.17

## 2025-02-14 NOTE — ED TRIAGE NOTES
Chief Complaint   Patient presents with    Chest Pain     Left sided cp x 2 hours, 3/10.     Pt reportedly called EMS due to SOB that she took inhaler for. Reports that SOB is completely resolved.      Pt requesting detox from meth.     Pt ambulatory to triage for above complaint.      Pt is alert/oriented and follows commands. Pt speaking in full sentences and responds appropriately to questions. No acute distress noted in triage and respirations are even and unlabored.     Pt placed in ecg room and educated on triage process. Pt encouraged to alert staff for any changes in condition.

## 2025-02-15 ENCOUNTER — HOSPITAL ENCOUNTER (EMERGENCY)
Facility: MEDICAL CENTER | Age: 65
End: 2025-02-15
Attending: EMERGENCY MEDICINE
Payer: MEDICARE

## 2025-02-15 VITALS
OXYGEN SATURATION: 96 % | BODY MASS INDEX: 29.44 KG/M2 | RESPIRATION RATE: 18 BRPM | TEMPERATURE: 97.7 F | DIASTOLIC BLOOD PRESSURE: 54 MMHG | HEART RATE: 75 BPM | HEIGHT: 62 IN | WEIGHT: 160 LBS | SYSTOLIC BLOOD PRESSURE: 97 MMHG

## 2025-02-15 DIAGNOSIS — F41.9 ANXIETY: ICD-10-CM

## 2025-02-15 LAB — POC BREATHALIZER: 0.03 PERCENT (ref 0–0.01)

## 2025-02-15 PROCEDURE — A9270 NON-COVERED ITEM OR SERVICE: HCPCS | Performed by: EMERGENCY MEDICINE

## 2025-02-15 PROCEDURE — 99283 EMERGENCY DEPT VISIT LOW MDM: CPT

## 2025-02-15 PROCEDURE — 700102 HCHG RX REV CODE 250 W/ 637 OVERRIDE(OP): Performed by: EMERGENCY MEDICINE

## 2025-02-15 PROCEDURE — 302970 POC BREATHALIZER: Performed by: EMERGENCY MEDICINE

## 2025-02-15 RX ORDER — DIAZEPAM 5 MG/1
5 TABLET ORAL EVERY 6 HOURS PRN
Status: COMPLETED | OUTPATIENT
Start: 2025-02-15 | End: 2025-02-15

## 2025-02-15 RX ADMIN — DIAZEPAM 5 MG: 5 TABLET ORAL at 18:59

## 2025-02-15 ASSESSMENT — HEART SCORE
TROPONIN: LESS THAN OR EQUAL TO NORMAL LIMIT
AGE: 65+
ECG: NORMAL
HEART SCORE: 3
RISK FACTORS: 1-2 RISK FACTORS
HISTORY: SLIGHTLY SUSPICIOUS

## 2025-02-15 ASSESSMENT — LIFESTYLE VARIABLES
TOTAL SCORE: 0
TOTAL SCORE: 0
HAVE PEOPLE ANNOYED YOU BY CRITICIZING YOUR DRINKING: NO
DO YOU DRINK ALCOHOL: NO
HAVE YOU EVER FELT YOU SHOULD CUT DOWN ON YOUR DRINKING: NO
EVER FELT BAD OR GUILTY ABOUT YOUR DRINKING: NO
EVER HAD A DRINK FIRST THING IN THE MORNING TO STEADY YOUR NERVES TO GET RID OF A HANGOVER: NO
TOTAL SCORE: 0
CONSUMPTION TOTAL: INCOMPLETE

## 2025-02-15 ASSESSMENT — FIBROSIS 4 INDEX: FIB4 SCORE: 1.08
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
## 2025-02-15 NOTE — DISCHARGE INSTRUCTIONS
You were seen in the Emergency Department for chest pain.    EKG, labs, chest xray were completed without significant acute abnormalities.    Please use 1,000mg of tylenol or 600mg of ibuprofen every 6 hours as needed for pain.    Please follow up with your primary care physician.    Return to the Emergency Department with other concerns.

## 2025-02-16 NOTE — ED PROVIDER NOTES
"ED Provider Note    CHIEF COMPLAINT  Chief Complaint   Patient presents with    Anxiety       HPI/ROS      Violeta Baez is a 64 y.o. female who presents with anxiety.  The patient is well-known to the emerged department for frequent visits from anxiety.  She does have a history of methamphetamine use but she denies recent use.  She states she is extremely anxious.  She is frustrated she states that she lost her physician and needs another primary care provider.  She states she does have her Cymbalta at home for her anxiety.  She states she had a hard time sleeping last night.    PAST MEDICAL HISTORY   has a past medical history of Anxiety, Asthma, CHF (congestive heart failure) (HCC), Hashimoto's disease, Hypertension, Hypothyroid, Methamphetamine abuse (HCC), Psychiatric disorder, TBI (traumatic brain injury) (HCC), and Thyroid cancer (HCC).    SURGICAL HISTORY   has a past surgical history that includes thyroidectomy; lumpectomy (Right); and tendon repair (Right).    FAMILY HISTORY  Family History   Problem Relation Age of Onset    No Known Problems Mother     Lung Cancer Father     No Known Problems Son     No Known Problems Son        SOCIAL HISTORY  Social History     Tobacco Use    Smoking status: Every Day     Current packs/day: 1.00     Average packs/day: 1 pack/day for 35.0 years (35.0 ttl pk-yrs)     Types: Cigarettes    Smokeless tobacco: Never   Vaping Use    Vaping status: Some Days    Substances: Nicotine   Substance and Sexual Activity    Alcohol use: Not Currently     Alcohol/week: 0.6 oz     Types: 1 Shots of liquor per week     Comment: vodka    Drug use: Yes     Frequency: 2.0 times per week     Comment: marijuana & meth\"I don't do them anymore\"    Sexual activity: Yes       CURRENT MEDICATIONS  Home Medications    **Home medications have not yet been reviewed for this encounter**         ALLERGIES  Allergies   Allergen Reactions    Fluoxetine      Irritability, insomnia       PHYSICAL " "EXAM  VITAL SIGNS: BP 94/53   Pulse 83   Temp 37.1 °C (98.8 °F)   Resp 18   Ht 1.575 m (5' 2\")   Wt 72.6 kg (160 lb)   SpO2 94%   BMI 29.26 kg/m²    General The patient appears anxious    HEENT unremarkable    Pulmonary the patient's lungs are clear to auscultation bilaterally    Cardiovascular S1-S2 with a regular rate and rhythm    GI abdomen soft    Skin no rashes, pallor, no jaundice    Extremities no distal edema    Neurologic examination is grossly intact    Psychiatric evaluation the patient does have some pressured speech and appears anxious.  She does not have any suicidal and homicidal ideation.      COURSE & MEDICAL DECISION MAKING    This is 64-year-old female who presents to the Emergency Department with anxiety.  The patient will receive IM for acute management.  She does have Cymbalta at home.  She states she is extremely stressed that she does not have a current doctor.  I will have the scheduling department contact the patient to make sure she has arranged follow-up on an outpatient basis.    I did have a good discussion with the patient regarding the need for smoking cessation.  Also had a good discussion with her regarding the need for cessation from any further methamphetamine use.    FINAL DIAGNOSIS  1.  Anxiety  2. Phillip Toth M.D. spent greater than 3 minutes with the patient explaining the importance of smoking cessation.      Disposition  Patient will be discharged in stable condition     Electronically signed by: Phillip Toth M.D., 2/15/2025 6:36 PM      "

## 2025-02-16 NOTE — ED TRIAGE NOTES
Chief Complaint   Patient presents with    Anxiety     Pt bib ems ambulatory to room c/o anxiety pt said that she takes Cymbalta for anxiety

## 2025-02-16 NOTE — DISCHARGE INSTRUCTIONS
Continue your current anxiolytics.  Refrain from smoking and methamphetamine use.  The scheduling department will contact you to help arrange outpatient follow-up with a primary care provider.

## 2025-02-17 ENCOUNTER — PATIENT OUTREACH (OUTPATIENT)
Dept: SCHEDULING | Facility: IMAGING CENTER | Age: 65
End: 2025-02-17
Payer: MEDICARE

## 2025-02-19 ENCOUNTER — HOSPITAL ENCOUNTER (EMERGENCY)
Facility: MEDICAL CENTER | Age: 65
End: 2025-02-19
Attending: EMERGENCY MEDICINE
Payer: MEDICARE

## 2025-02-19 ENCOUNTER — PHARMACY VISIT (OUTPATIENT)
Dept: PHARMACY | Facility: MEDICAL CENTER | Age: 65
End: 2025-02-19
Payer: COMMERCIAL

## 2025-02-19 VITALS
BODY MASS INDEX: 29.44 KG/M2 | HEIGHT: 62 IN | HEART RATE: 70 BPM | SYSTOLIC BLOOD PRESSURE: 119 MMHG | DIASTOLIC BLOOD PRESSURE: 68 MMHG | WEIGHT: 160 LBS | OXYGEN SATURATION: 98 % | TEMPERATURE: 98.5 F | RESPIRATION RATE: 18 BRPM

## 2025-02-19 DIAGNOSIS — F41.9 ANXIETY: ICD-10-CM

## 2025-02-19 DIAGNOSIS — F10.10 ALCOHOL ABUSE: ICD-10-CM

## 2025-02-19 DIAGNOSIS — F17.218 CIGARETTE NICOTINE DEPENDENCE WITH OTHER NICOTINE-INDUCED DISORDER: ICD-10-CM

## 2025-02-19 PROCEDURE — 99406 BEHAV CHNG SMOKING 3-10 MIN: CPT

## 2025-02-19 PROCEDURE — 700102 HCHG RX REV CODE 250 W/ 637 OVERRIDE(OP): Performed by: EMERGENCY MEDICINE

## 2025-02-19 PROCEDURE — A9270 NON-COVERED ITEM OR SERVICE: HCPCS | Performed by: EMERGENCY MEDICINE

## 2025-02-19 PROCEDURE — 99284 EMERGENCY DEPT VISIT MOD MDM: CPT

## 2025-02-19 PROCEDURE — RXMED WILLOW AMBULATORY MEDICATION CHARGE: Performed by: EMERGENCY MEDICINE

## 2025-02-19 RX ORDER — NICOTINE 21 MG/24HR
1 PATCH, TRANSDERMAL 24 HOURS TRANSDERMAL EVERY 24 HOURS
Qty: 28 PATCH | Refills: 0 | Status: SHIPPED | OUTPATIENT
Start: 2025-02-19 | End: 2025-02-24 | Stop reason: SDUPTHER

## 2025-02-19 RX ORDER — HYDROXYZINE HYDROCHLORIDE 25 MG/1
25 TABLET, FILM COATED ORAL ONCE
Status: COMPLETED | OUTPATIENT
Start: 2025-02-19 | End: 2025-02-19

## 2025-02-19 RX ORDER — NICOTINE 21 MG/24HR
1 PATCH, TRANSDERMAL 24 HOURS TRANSDERMAL ONCE
Status: DISCONTINUED | OUTPATIENT
Start: 2025-02-19 | End: 2025-02-19 | Stop reason: HOSPADM

## 2025-02-19 RX ORDER — HYDROXYZINE HYDROCHLORIDE 25 MG/1
25 TABLET, FILM COATED ORAL 3 TIMES DAILY PRN
Qty: 30 TABLET | Refills: 0 | Status: SHIPPED | OUTPATIENT
Start: 2025-02-19 | End: 2025-02-24 | Stop reason: SDUPTHER

## 2025-02-19 RX ADMIN — HYDROXYZINE HYDROCHLORIDE 25 MG: 25 TABLET, FILM COATED ORAL at 11:23

## 2025-02-19 RX ADMIN — NICOTINE 14 MG: 14 PATCH TRANSDERMAL at 11:22

## 2025-02-19 ASSESSMENT — FIBROSIS 4 INDEX: FIB4 SCORE: 1.08

## 2025-02-19 NOTE — ED NOTES
Bedside report from SIDNEY Acuna. Pt resting in rney comfortably, on monitor, call light in reach.  Pt is on RA, GCS 15.  Necessary fall precautions in place.

## 2025-02-19 NOTE — ED NOTES
Peer support got pt's medications and brought to bedside. Pt discharged tolobby with steady gait. GCS 15. Pt in possession of belongings. Pt provided discharge education and information pertaining to medications and follow up appointments. Pt received copy of discharge instructions and verbalized understanding.     Vitals:    02/19/25 1049   BP: 119/68   Pulse: 70   Resp: 18   Temp: 36.9 °C (98.5 °F)   SpO2: 98%   \
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
## 2025-02-19 NOTE — DISCHARGE PLANNING
@12:09 - - Facilitated 1:1 peer support session to promote wellness, self-care, and recovery-oriented practices. Educated PT in accessing community resources, , and other support networks to enhance their overall well-being and quality of life including  Health, [place] and AA.  PT was alert and communicative.  PT was able to verbalize needs and wants.  PT was grateful for resources.      PT being prepared for imminent discharge.    [facility] Department Contact numbers:     [facility] [phone] Alert Team [phone]
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
## 2025-02-24 ENCOUNTER — HOSPITAL ENCOUNTER (OUTPATIENT)
Dept: LAB | Facility: MEDICAL CENTER | Age: 65
End: 2025-02-24
Attending: PHYSICIAN ASSISTANT
Payer: MEDICARE

## 2025-02-24 ENCOUNTER — OFFICE VISIT (OUTPATIENT)
Dept: MEDICAL GROUP | Facility: PHYSICIAN GROUP | Age: 65
End: 2025-02-24
Payer: MEDICARE

## 2025-02-24 VITALS
HEIGHT: 62 IN | TEMPERATURE: 98.1 F | WEIGHT: 174 LBS | DIASTOLIC BLOOD PRESSURE: 66 MMHG | SYSTOLIC BLOOD PRESSURE: 122 MMHG | BODY MASS INDEX: 32.02 KG/M2 | HEART RATE: 62 BPM | OXYGEN SATURATION: 95 %

## 2025-02-24 DIAGNOSIS — Z72.0 TOBACCO ABUSE: ICD-10-CM

## 2025-02-24 DIAGNOSIS — F10.10 ALCOHOL ABUSE: ICD-10-CM

## 2025-02-24 DIAGNOSIS — F17.210 NICOTINE DEPENDENCE, CIGARETTES, UNCOMPLICATED: ICD-10-CM

## 2025-02-24 DIAGNOSIS — I50.32 CHRONIC HEART FAILURE WITH PRESERVED EJECTION FRACTION (HCC): ICD-10-CM

## 2025-02-24 DIAGNOSIS — F17.218 CIGARETTE NICOTINE DEPENDENCE WITH OTHER NICOTINE-INDUCED DISORDER: ICD-10-CM

## 2025-02-24 DIAGNOSIS — F41.1 GAD (GENERALIZED ANXIETY DISORDER): ICD-10-CM

## 2025-02-24 DIAGNOSIS — F39 MOOD DISORDER (HCC): ICD-10-CM

## 2025-02-24 DIAGNOSIS — F41.9 ANXIETY: ICD-10-CM

## 2025-02-24 DIAGNOSIS — J45.20 MILD INTERMITTENT ASTHMA WITHOUT COMPLICATION: ICD-10-CM

## 2025-02-24 DIAGNOSIS — Z23 NEED FOR VACCINATION: ICD-10-CM

## 2025-02-24 DIAGNOSIS — E66.811 CLASS 1 OBESITY DUE TO EXCESS CALORIES WITH SERIOUS COMORBIDITY AND BODY MASS INDEX (BMI) OF 31.0 TO 31.9 IN ADULT: ICD-10-CM

## 2025-02-24 DIAGNOSIS — E89.0 POSTOPERATIVE HYPOTHYROIDISM: ICD-10-CM

## 2025-02-24 DIAGNOSIS — Z78.0 POSTMENOPAUSAL: ICD-10-CM

## 2025-02-24 DIAGNOSIS — S06.9X9S TRAUMATIC BRAIN INJURY WITH LOSS OF CONSCIOUSNESS, SEQUELA (HCC): ICD-10-CM

## 2025-02-24 DIAGNOSIS — R73.03 PRE-DIABETES: ICD-10-CM

## 2025-02-24 DIAGNOSIS — E66.09 CLASS 1 OBESITY DUE TO EXCESS CALORIES WITH SERIOUS COMORBIDITY AND BODY MASS INDEX (BMI) OF 31.0 TO 31.9 IN ADULT: ICD-10-CM

## 2025-02-24 DIAGNOSIS — F43.10 PTSD (POST-TRAUMATIC STRESS DISORDER): ICD-10-CM

## 2025-02-24 PROCEDURE — 80048 BASIC METABOLIC PNL TOTAL CA: CPT

## 2025-02-24 PROCEDURE — G0008 ADMIN INFLUENZA VIRUS VAC: HCPCS | Performed by: FAMILY MEDICINE

## 2025-02-24 PROCEDURE — 90662 IIV NO PRSV INCREASED AG IM: CPT | Performed by: FAMILY MEDICINE

## 2025-02-24 PROCEDURE — 3078F DIAST BP <80 MM HG: CPT | Performed by: FAMILY MEDICINE

## 2025-02-24 PROCEDURE — 3074F SYST BP LT 130 MM HG: CPT | Performed by: FAMILY MEDICINE

## 2025-02-24 PROCEDURE — 99214 OFFICE O/P EST MOD 30 MIN: CPT | Mod: 25 | Performed by: FAMILY MEDICINE

## 2025-02-24 PROCEDURE — 36415 COLL VENOUS BLD VENIPUNCTURE: CPT

## 2025-02-24 RX ORDER — LEVOTHYROXINE SODIUM 150 UG/1
150 TABLET ORAL
Qty: 90 TABLET | Refills: 3 | Status: SHIPPED | OUTPATIENT
Start: 2025-02-24

## 2025-02-24 RX ORDER — HYDROXYZINE HYDROCHLORIDE 25 MG/1
25 TABLET, FILM COATED ORAL 3 TIMES DAILY PRN
Qty: 180 TABLET | Refills: 3 | Status: SHIPPED | OUTPATIENT
Start: 2025-02-24

## 2025-02-24 RX ORDER — DULOXETIN HYDROCHLORIDE 30 MG/1
30 CAPSULE, DELAYED RELEASE ORAL DAILY
Qty: 90 CAPSULE | Refills: 3 | Status: SHIPPED | OUTPATIENT
Start: 2025-02-24

## 2025-02-24 RX ORDER — DULOXETIN HYDROCHLORIDE 60 MG/1
60 CAPSULE, DELAYED RELEASE ORAL DAILY
Qty: 90 CAPSULE | Refills: 3 | Status: SHIPPED | OUTPATIENT
Start: 2025-02-24 | End: 2026-02-19

## 2025-02-24 RX ORDER — NICOTINE 21 MG/24HR
1 PATCH, TRANSDERMAL 24 HOURS TRANSDERMAL EVERY 24 HOURS
Qty: 90 PATCH | Refills: 0 | Status: SHIPPED | OUTPATIENT
Start: 2025-02-24

## 2025-02-24 RX ORDER — ALBUTEROL SULFATE 90 UG/1
2 INHALANT RESPIRATORY (INHALATION) EVERY 6 HOURS PRN
Qty: 8.5 G | Refills: 3 | Status: SHIPPED | OUTPATIENT
Start: 2025-02-24

## 2025-02-24 ASSESSMENT — FIBROSIS 4 INDEX: FIB4 SCORE: 1.08

## 2025-02-24 ASSESSMENT — PATIENT HEALTH QUESTIONNAIRE - PHQ9: CLINICAL INTERPRETATION OF PHQ2 SCORE: 0

## 2025-02-24 NOTE — PROGRESS NOTES
Verbal consent was acquired by the patient to use OnBeep ambient listening note generation during this visit.    Subjective:     HPI:   History of Present Illness  64-year-old female presents today as an established patient for evaluation for multiple ER visits.  Patient was last seen in office on 10/16/2024.  Patient does have a history of alcohol abuse anxiety TBI hypertension nicotine dependence tobacco abuse.  Patient is currently on duloxetine as well as Atarax and does see a psychiatrist.  Patient is currently following with Dr. Lomax.  Patient has been seen in the emergency department 8 times in the last 2 months.    Patient is currently interested in tobacco and alcohol cessation and would like resources.    Patient's last appointment with behavioral health was on 1/27/2025.    Patient is due for bone density screening as well as multiple vaccines which will be discussed in office today.      At last appointment patient was referred to gynecology for cervical cancer screening.    Patient is currently on Cymbalta 90 mg daily as well as hydroxyzine 25 mg 3 times daily as needed for anxiety and does follow with her psychiatrist every week.  Patient reports she missed her last appointment and is concerned she was discharged from the practice.  Patient was instructed to call Dr. Lomax's office to reschedule next appointments.      Patient also follows with cardiology and is currently on Coreg 3.125 twice daily as well as Jardiance 10 mg nightly lisinopril-hydrochlorothiazide 20-25 daily as well as spironolactone 25 mg a day.    Prediabetes and is currently on Jardiance 10 mg nightly.      Patient is also using nicotine patches on a daily basis however she continues to smoke with an aim to quit.    Patient is on levothyroxine 150 mcg daily as she has had a previous thyroidectomy and has post operative hypothyroidism.      Health Maintenance: Completed    Objective:     Exam:  /66 (BP Location: Right  "arm, Patient Position: Sitting, BP Cuff Size: Adult)   Pulse 62   Temp 36.7 °C (98.1 °F) (Temporal)   Ht 1.575 m (5' 2\")   Wt 78.9 kg (174 lb)   SpO2 95%   BMI 31.83 kg/m²  Body mass index is 31.83 kg/m².    Physical Exam  Vitals reviewed.   Constitutional:       Appearance: Normal appearance.   HENT:      Head: Normocephalic and atraumatic.      Right Ear: External ear normal.      Left Ear: External ear normal.      Nose: Nose normal.   Cardiovascular:      Rate and Rhythm: Normal rate and regular rhythm.      Pulses: Normal pulses.      Heart sounds: Normal heart sounds. No murmur heard.  Pulmonary:      Effort: Pulmonary effort is normal. No respiratory distress.      Breath sounds: Normal breath sounds. No wheezing.   Abdominal:      General: Abdomen is flat.      Palpations: Abdomen is soft.   Skin:     General: Skin is warm and dry.   Neurological:      Mental Status: She is alert and oriented to person, place, and time.   Psychiatric:         Mood and Affect: Mood normal.         Behavior: Behavior normal.             Results      Assessment & Plan:     1. EDUARDO (generalized anxiety disorder)  DULoxetine (CYMBALTA) 30 MG Cap DR Particles    DULoxetine (CYMBALTA) 60 MG Cap DR Particles delayed-release capsule      2. Mood disorder (HCC)  DULoxetine (CYMBALTA) 60 MG Cap DR Particles delayed-release capsule      3. PTSD (post-traumatic stress disorder)  DULoxetine (CYMBALTA) 60 MG Cap DR Particles delayed-release capsule      4. Anxiety  hydrOXYzine HCl (ATARAX) 25 MG Tab      5. Cigarette nicotine dependence with other nicotine-induced disorder  nicotine (NICODERM) 14 MG/24HR PATCH 24 HR      6. Traumatic brain injury with loss of consciousness, sequela (HCC)        7. Tobacco abuse        8. Pre-diabetes        9. Postoperative hypothyroidism        10. Nicotine dependence, uncomplicated (Current Smoker)        11. Mild intermittent asthma without complication  albuterol 108 (90 Base) MCG/ACT Aero Soln " inhalation aerosol      12. Alcohol abuse        13. Class 1 obesity due to excess calories with serious comorbidity and body mass index (BMI) of 31.0 to 31.9 in adult  Patient identified as having weight management issue.  Appropriate orders and counseling given.      14. Postmenopausal  DS-BONE DENSITY STUDY (DEXA)      15. Need for vaccination  Influenza Vaccine, High Dose (65+ Only)          Assessment & Plan  Anxiety  Continue duloxetine 90 mg daily  Continue Atarax 3 times daily as needed  Recommended continued weekly sessions with psychiatrist Dr. Lomax    Prediabetes  Please continue Jardiance 10 mg nightly    Hypothyroidism  Stable, continue levothyroxine 150 mcg every morning    CHF  Continue Coreg 3.125 twice daily  Continue lisinopril-hydrochlorothiazide 20-25 daily  Continue spironolactone 25 mg daily  Recommended close follow-up with cardiology    Hypertension  Continue lisinopril-hydrochlorothiazide 20-25 daily        No follow-ups on file.    Please note that this dictation was created using voice recognition software. I have made every reasonable attempt to correct obvious errors, but I expect that there are errors of grammar and possibly content that I did not discover before finalizing the note.    Alicia Kramer MD  Family Medicine and Non - Operative Sports Medicine   [facility]

## 2025-02-25 ENCOUNTER — RESULTS FOLLOW-UP (OUTPATIENT)
Dept: CARDIOLOGY | Facility: MEDICAL CENTER | Age: 65
End: 2025-02-25

## 2025-02-25 LAB
ANION GAP SERPL CALC-SCNC: 11 MMOL/L (ref 7–16)
BUN SERPL-MCNC: 28 MG/DL (ref 8–22)
CALCIUM SERPL-MCNC: 8.7 MG/DL (ref 8.5–10.5)
CHLORIDE SERPL-SCNC: 104 MMOL/L (ref 96–112)
CO2 SERPL-SCNC: 25 MMOL/L (ref 20–33)
CREAT SERPL-MCNC: 1.32 MG/DL (ref 0.5–1.4)
GFR SERPLBLD CREATININE-BSD FMLA CKD-EPI: 45 ML/MIN/1.73 M 2
GLUCOSE SERPL-MCNC: 105 MG/DL (ref 65–99)
POTASSIUM SERPL-SCNC: 4.3 MMOL/L (ref 3.6–5.5)
SODIUM SERPL-SCNC: 140 MMOL/L (ref 135–145)

## 2025-02-28 ENCOUNTER — OFFICE VISIT (OUTPATIENT)
Dept: BEHAVIORAL HEALTH | Facility: CLINIC | Age: 65
End: 2025-02-28
Payer: MEDICARE

## 2025-02-28 DIAGNOSIS — F41.1 GAD (GENERALIZED ANXIETY DISORDER): ICD-10-CM

## 2025-02-28 DIAGNOSIS — F39 MOOD DISORDER (HCC): ICD-10-CM

## 2025-02-28 DIAGNOSIS — F43.10 PTSD (POST-TRAUMATIC STRESS DISORDER): ICD-10-CM

## 2025-02-28 ASSESSMENT — ENCOUNTER SYMPTOMS
BLOOD IN STOOL: 0
BLURRED VISION: 0
SEIZURES: 0
WHEEZING: 0
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
## 2025-03-09 ENCOUNTER — HOSPITAL ENCOUNTER (EMERGENCY)
Facility: MEDICAL CENTER | Age: 65
End: 2025-03-09
Attending: EMERGENCY MEDICINE
Payer: MEDICARE

## 2025-03-09 VITALS
HEART RATE: 60 BPM | TEMPERATURE: 97.5 F | SYSTOLIC BLOOD PRESSURE: 144 MMHG | WEIGHT: 173.94 LBS | OXYGEN SATURATION: 95 % | BODY MASS INDEX: 32.01 KG/M2 | RESPIRATION RATE: 18 BRPM | DIASTOLIC BLOOD PRESSURE: 82 MMHG | HEIGHT: 62 IN

## 2025-03-09 DIAGNOSIS — F17.200 TOBACCO DEPENDENCE: ICD-10-CM

## 2025-03-09 PROCEDURE — A9270 NON-COVERED ITEM OR SERVICE: HCPCS | Performed by: EMERGENCY MEDICINE

## 2025-03-09 PROCEDURE — 99283 EMERGENCY DEPT VISIT LOW MDM: CPT

## 2025-03-09 PROCEDURE — 700102 HCHG RX REV CODE 250 W/ 637 OVERRIDE(OP): Performed by: EMERGENCY MEDICINE

## 2025-03-09 RX ORDER — NICOTINE 21 MG/24HR
1 PATCH, TRANSDERMAL 24 HOURS TRANSDERMAL ONCE
Status: DISCONTINUED | OUTPATIENT
Start: 2025-03-09 | End: 2025-03-09 | Stop reason: HOSPADM

## 2025-03-09 RX ADMIN — NICOTINE TRANSDERMAL SYSTEM 21 MG: 21 PATCH, EXTENDED RELEASE TRANSDERMAL at 18:55

## 2025-03-09 ASSESSMENT — FIBROSIS 4 INDEX: FIB4 SCORE: 1.08

## 2025-03-10 ENCOUNTER — HOSPITAL ENCOUNTER (EMERGENCY)
Facility: MEDICAL CENTER | Age: 65
End: 2025-03-10
Attending: EMERGENCY MEDICINE
Payer: MEDICARE

## 2025-03-10 ENCOUNTER — HOSPITAL ENCOUNTER (EMERGENCY)
Facility: MEDICAL CENTER | Age: 65
End: 2025-03-10
Attending: STUDENT IN AN ORGANIZED HEALTH CARE EDUCATION/TRAINING PROGRAM
Payer: MEDICARE

## 2025-03-10 ENCOUNTER — PHARMACY VISIT (OUTPATIENT)
Dept: PHARMACY | Facility: MEDICAL CENTER | Age: 65
End: 2025-03-10
Payer: COMMERCIAL

## 2025-03-10 VITALS
DIASTOLIC BLOOD PRESSURE: 67 MMHG | OXYGEN SATURATION: 91 % | HEART RATE: 50 BPM | WEIGHT: 173 LBS | TEMPERATURE: 98.2 F | SYSTOLIC BLOOD PRESSURE: 132 MMHG | BODY MASS INDEX: 31.83 KG/M2 | RESPIRATION RATE: 20 BRPM | HEIGHT: 62 IN

## 2025-03-10 VITALS
WEIGHT: 176.37 LBS | HEART RATE: 60 BPM | RESPIRATION RATE: 18 BRPM | DIASTOLIC BLOOD PRESSURE: 72 MMHG | SYSTOLIC BLOOD PRESSURE: 138 MMHG | BODY MASS INDEX: 32.46 KG/M2 | TEMPERATURE: 97.3 F | OXYGEN SATURATION: 95 % | HEIGHT: 62 IN

## 2025-03-10 DIAGNOSIS — Z72.0 TOBACCO ABUSE: ICD-10-CM

## 2025-03-10 DIAGNOSIS — F41.9 ANXIETY: ICD-10-CM

## 2025-03-10 DIAGNOSIS — F41.1 ANXIETY REACTION: ICD-10-CM

## 2025-03-10 DIAGNOSIS — K08.89 PAIN, DENTAL: ICD-10-CM

## 2025-03-10 PROCEDURE — A9270 NON-COVERED ITEM OR SERVICE: HCPCS | Performed by: EMERGENCY MEDICINE

## 2025-03-10 PROCEDURE — 700102 HCHG RX REV CODE 250 W/ 637 OVERRIDE(OP): Performed by: EMERGENCY MEDICINE

## 2025-03-10 PROCEDURE — 99283 EMERGENCY DEPT VISIT LOW MDM: CPT

## 2025-03-10 PROCEDURE — RXMED WILLOW AMBULATORY MEDICATION CHARGE: Performed by: STUDENT IN AN ORGANIZED HEALTH CARE EDUCATION/TRAINING PROGRAM

## 2025-03-10 RX ORDER — ACETAMINOPHEN 500 MG
1000 TABLET ORAL ONCE
Status: COMPLETED | OUTPATIENT
Start: 2025-03-10 | End: 2025-03-10

## 2025-03-10 RX ORDER — AMOXICILLIN 500 MG/1
500 CAPSULE ORAL 3 TIMES DAILY
Qty: 21 CAPSULE | Refills: 0 | Status: ACTIVE | OUTPATIENT
Start: 2025-03-10 | End: 2025-03-17

## 2025-03-10 RX ORDER — NICOTINE 21 MG/24HR
1 PATCH, TRANSDERMAL 24 HOURS TRANSDERMAL EVERY 24 HOURS
Qty: 28 PATCH | Refills: 0 | Status: SHIPPED | OUTPATIENT
Start: 2025-03-10

## 2025-03-10 RX ORDER — AMOXICILLIN 500 MG/1
500 CAPSULE ORAL ONCE
Status: COMPLETED | OUTPATIENT
Start: 2025-03-10 | End: 2025-03-10

## 2025-03-10 RX ORDER — HYDROXYZINE HYDROCHLORIDE 25 MG/1
25 TABLET, FILM COATED ORAL ONCE
Status: COMPLETED | OUTPATIENT
Start: 2025-03-10 | End: 2025-03-10

## 2025-03-10 RX ADMIN — HYDROXYZINE HYDROCHLORIDE 25 MG: 25 TABLET, FILM COATED ORAL at 13:47

## 2025-03-10 RX ADMIN — AMOXICILLIN 500 MG: 500 CAPSULE ORAL at 13:47

## 2025-03-10 RX ADMIN — ACETAMINOPHEN 1000 MG: 500 TABLET ORAL at 13:47

## 2025-03-10 ASSESSMENT — FIBROSIS 4 INDEX
FIB4 SCORE: 1.08
FIB4 SCORE: 1.08

## 2025-03-10 NOTE — ED TRIAGE NOTES
"Chief Complaint   Patient presents with    Anxiety     BIB EMS from [facility], patient reports \"I am not feeling good, I'm not sure why I am here\". Patient states she supposed to take her anti anxiety meds but she missed it. Denies drug and alcohol use.      Denies SI/HI.    BP 93/67   Pulse 88   Temp 36.8 °C (98.2 °F) (Temporal)   Resp 20   Ht 1.575 m (5' 2\")   Wt 78.5 kg (173 lb)   SpO2 97%   BMI 31.64 kg/m²     On wheelchair   Alert and Oriented: x 4  Pt is speaking in full sentences, and does not responds appropriately to questions.     Respirations are even and unlabored.    "

## 2025-03-10 NOTE — ED NOTES
"Violeta Baez has been discharged from the Emergency Room.    Pt in possession of belongings.    Discharge instructions, which include signs and symptoms to monitor patient for, as well as detailed information regarding anxiety provided.  Patient verbalizes understanding of follow up care and medication management. All questions and concerns addressed at this time.     Patient provided with education on when to return to the ER and verbally understands with no concerns. Patient advised on setting up MyChart and information provided about patient survey.  Patient leaves ER in no apparent distress. This RN provided education regarding returning to the ER for any new concerns or changes in patient's condition.      /67   Pulse (!) 50   Temp 36.8 °C (98.2 °F) (Temporal)   Resp 20   Ht 1.575 m (5' 2\")   Wt 78.5 kg (173 lb)   SpO2 91%   BMI 31.64 kg/m²    "
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
## 2025-03-10 NOTE — DISCHARGE INSTRUCTIONS
You were seen in the Emergency Department for dental/jaw pain.    Please use 1,000mg of tylenol or 600mg of ibuprofen every 6 hours as needed for pain.    Take antibiotics as directed and call a dentist for follow up.   Continue home medications.    Please follow up with your primary care physician.    Return to the Emergency Department with other concerns.

## 2025-03-10 NOTE — ED PROVIDER NOTES
"ED Provider Note    Primary care provider: Alicia Kramer M.D.    CHIEF COMPLAINT  Chief Complaint   Patient presents with    Ear Pain       HPI  Violeta Baez is a 64 y.o. female who presents to the Emergency Department.  Bilaterally sure why she is here.  She is here quite frequently.  When I go to talk to her she asked me multiple times that she is in trouble.  She then tells me that she is having a hard time quitting smoking would like a new nicotine patch.    External record review: Patient was seen in behavioral health February 28 to follow-up on PTSD and anxiety.  Is in the emergency department with some regularity, typically averages about 4-5 visits a month.       PAST MEDICAL HISTORY   has a past medical history of Anxiety, Asthma, CHF (congestive heart failure) (HCC), Hashimoto's disease, Hypertension, Hypothyroid, Methamphetamine abuse (HCC), Psychiatric disorder, TBI (traumatic brain injury) (HCC), and Thyroid cancer (HCC).    SURGICAL HISTORY   has a past surgical history that includes thyroidectomy; lumpectomy (Right); and tendon repair (Right).    PHYSICAL EXAM  VITAL SIGNS: BP (!) 144/82   Pulse 60   Temp 36.4 °C (97.5 °F) (Temporal)   Resp 18   Ht 1.575 m (5' 2\")   Wt 78.9 kg (173 lb 15.1 oz)   SpO2 95%   BMI 31.81 kg/m²   Constitutional: Awake, alert in no apparent distress.  Mildly anxious.  HENT: Normocephalic, Bilateral external ears normal. Nose normal.   Eyes: Conjunctiva normal, non-icteric, EOMI.    Thorax & Lungs: Easy unlabored respirations  Cardiovascular:    Abdomen:  No distention  Skin: Visualized skin is  Dry, No erythema, No rash.   Extremities:   atraumatic   Neurologic: Alert, Grossly non-focal.  Normal speech, stance, gait.  Psychiatric: Affect and Mood normal      Escalation of care considered, and ultimately not performed: Laboratory analysis and diagnostic imaging.    Decision Making:  This is a pleasant 64 y.o. year old female who presents with really no chief " complaint.  She was asking for a nicotine patch which we applied here in the ER.  I did do a skin exam I do not see any nicotine patches currently on her body.  This is consistent with some of her prior presentations today.       The patient was discharged (see d/c instructions) was told to return immediately for any signs or symptoms listed, or any worsening at all.  The patient verbally agreed to the discharge precautions and follow-up plan which is documented in EPIC.    Discharge Medications:  Discharge Medication List as of 3/9/2025  6:51 PM          FINAL IMPRESSION  1. Tobacco dependence
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
## 2025-03-10 NOTE — ED TRIAGE NOTES
Pt bib ems.  Chief Complaint   Patient presents with    Anxiety     Pt pacing and rambling. Follows commands but reports she can't calm down.

## 2025-03-10 NOTE — ED NOTES
Pt ambulated to restroom and back to room with steady gate. Pt on monitor. Vitals stable. GIANLUCA Wang locked in lowest position. All belongings with patient.

## 2025-03-14 ENCOUNTER — OFFICE VISIT (OUTPATIENT)
Dept: BEHAVIORAL HEALTH | Facility: CLINIC | Age: 65
End: 2025-03-14
Payer: MEDICARE

## 2025-03-14 DIAGNOSIS — F41.1 GAD (GENERALIZED ANXIETY DISORDER): ICD-10-CM

## 2025-03-14 DIAGNOSIS — F39 MOOD DISORDER (HCC): ICD-10-CM

## 2025-03-14 DIAGNOSIS — F43.10 PTSD (POST-TRAUMATIC STRESS DISORDER): ICD-10-CM

## 2025-03-14 PROCEDURE — 99214 OFFICE O/P EST MOD 30 MIN: CPT | Mod: GC | Performed by: PSYCHIATRY & NEUROLOGY

## 2025-03-14 RX ORDER — MELATONIN 3 MG
3 CAPSULE ORAL
Qty: 30 CAPSULE | Refills: 2 | Status: SHIPPED | OUTPATIENT
Start: 2025-03-14 | End: 2025-06-12
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
## 2025-03-17 ENCOUNTER — OFFICE VISIT (OUTPATIENT)
Dept: BEHAVIORAL HEALTH | Facility: CLINIC | Age: 65
End: 2025-03-17
Payer: MEDICARE

## 2025-03-17 DIAGNOSIS — F41.9 ANXIETY: ICD-10-CM

## 2025-03-17 DIAGNOSIS — F39 MOOD DISORDER (HCC): ICD-10-CM

## 2025-03-17 DIAGNOSIS — R41.89 OTHER SYMPTOMS AND SIGNS INVOLVING COGNITIVE FUNCTIONS AND AWARENESS: ICD-10-CM

## 2025-03-17 DIAGNOSIS — F19.10 SUBSTANCE ABUSE (HCC): ICD-10-CM

## 2025-03-17 PROCEDURE — 96139 PSYCL/NRPSYC TST TECH EA: CPT | Performed by: COUNSELOR

## 2025-03-17 PROCEDURE — 96116 NUBHVL XM PHYS/QHP 1ST HR: CPT | Performed by: COUNSELOR

## 2025-03-17 PROCEDURE — 96138 PSYCL/NRPSYC TECH 1ST: CPT | Performed by: COUNSELOR

## 2025-03-17 NOTE — PROGRESS NOTES
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  Raw ss / z / SS Demo T DC   WAIS-IV Similarities 26 11 55 69   TMT - Part B (errors) 76 (0) 11   63   Stroop Color-Word (Avi, et. al.,  2022) 30   49 46   Stroop Interference (Avi, et. al.,  2022) 2   52 58   Mood Raw Descriptor   BDI-II 14 mild   MICHELLE 12 mild       Neuropsychological Services Provided Code Total Units Date/Time   Clinical Interview/Neurobehavioral Status Exam          Hour 1 03381 1  3/17 8-850   Neuropsychological Test Administration and Scoring by Technician         First 30-minutes 94894 1  3/17 [id number]       Additional 30-minutes 10708 6  3/17  944-1141; 1150-2768

## 2025-03-21 ENCOUNTER — OFFICE VISIT (OUTPATIENT)
Dept: BEHAVIORAL HEALTH | Facility: CLINIC | Age: 65
End: 2025-03-21
Payer: MEDICARE

## 2025-03-21 DIAGNOSIS — F39 MOOD DISORDER (HCC): ICD-10-CM

## 2025-03-21 DIAGNOSIS — F43.10 PTSD (POST-TRAUMATIC STRESS DISORDER): ICD-10-CM

## 2025-03-21 DIAGNOSIS — F41.1 GAD (GENERALIZED ANXIETY DISORDER): ICD-10-CM

## 2025-03-21 PROCEDURE — 99999 PR NO CHARGE: CPT | Performed by: PSYCHIATRY & NEUROLOGY

## 2025-03-22 ENCOUNTER — HOSPITAL ENCOUNTER (EMERGENCY)
Facility: MEDICAL CENTER | Age: 65
End: 2025-03-22
Attending: STUDENT IN AN ORGANIZED HEALTH CARE EDUCATION/TRAINING PROGRAM
Payer: MEDICARE

## 2025-03-22 VITALS
BODY MASS INDEX: 32.39 KG/M2 | OXYGEN SATURATION: 95 % | RESPIRATION RATE: 18 BRPM | WEIGHT: 176 LBS | HEART RATE: 54 BPM | TEMPERATURE: 97.3 F | DIASTOLIC BLOOD PRESSURE: 53 MMHG | HEIGHT: 62 IN | SYSTOLIC BLOOD PRESSURE: 109 MMHG

## 2025-03-22 DIAGNOSIS — F41.9 ANXIETY: ICD-10-CM

## 2025-03-22 DIAGNOSIS — Z72.0 TOBACCO USE: ICD-10-CM

## 2025-03-22 PROCEDURE — 99284 EMERGENCY DEPT VISIT MOD MDM: CPT

## 2025-03-22 ASSESSMENT — FIBROSIS 4 INDEX: FIB4 SCORE: 1.08

## 2025-03-22 ASSESSMENT — PAIN DESCRIPTION - PAIN TYPE: TYPE: ACUTE PAIN

## 2025-03-22 NOTE — PROGRESS NOTES
Full therapy note has been documented and is under restricted viewing.  Please see below for summary of today's session.      Patient Name: Violeta Baez  Patient MRN: [id number]  Session Date: 3/21/2025  Resident/Fellow providing service: Humphrey Lomax M.D.     Type of session: Individual psychotherapy  Session start time: 10:00am  Session stop time: 11:00am  Length of time spent face to face with patient: 1 hour  Persons in attendance: Patient     Diagnoses: PTSD, Mood Disorder, EDUARDO     Symptoms currently being addressed in therapy: anxiety symptoms, mood symptoms     Therapeutic Intervention(s): Develop/modify treatment plan, establish rapport and goal-setting.       Medications Reviewed: Yes     Treatment Goal(s)/Objective(s) addressed: Apply treatment strategies     Subjective: Today, we further discussed and applied psychotherapy strategies regarding Mrs. Baze’s goals and experiences. Specific details are documented under psychotherapy note for the session.      Plan:  - Continue weekly therapy.  - Continue Cymbata 90mg per day for mood and anxiety  - Continue hydroxyzine 25mg as needed up to four times per day for anxiety  - Continue rapport building and discussion of navigation of interpersonal relationships     Discussed with supervising attending, Dr. Louis Lomax M.D.
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
## 2025-03-23 NOTE — ED TRIAGE NOTES
Chief Complaint   Patient presents with    Psych Eval     BIBA for manic behavior. Pt is panicky, screaming occasionally, and paranoid. She is concerned that she will be arrested for calling 911 too frequently.
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
## 2025-03-27 ENCOUNTER — PHARMACY VISIT (OUTPATIENT)
Dept: PHARMACY | Facility: MEDICAL CENTER | Age: 65
End: 2025-03-27
Payer: COMMERCIAL

## 2025-03-27 ENCOUNTER — HOSPITAL ENCOUNTER (EMERGENCY)
Facility: MEDICAL CENTER | Age: 65
End: 2025-03-28
Attending: EMERGENCY MEDICINE
Payer: MEDICARE

## 2025-03-27 DIAGNOSIS — F15.10 METHAMPHETAMINE ABUSE (HCC): ICD-10-CM

## 2025-03-27 DIAGNOSIS — N30.00 ACUTE CYSTITIS WITHOUT HEMATURIA: ICD-10-CM

## 2025-03-27 LAB
ALBUMIN SERPL BCP-MCNC: 4.1 G/DL (ref 3.2–4.9)
ALBUMIN/GLOB SERPL: 1.2 G/DL
ALP SERPL-CCNC: 72 U/L (ref 30–99)
ALT SERPL-CCNC: 16 U/L (ref 2–50)
AMPHET UR QL SCN: POSITIVE
ANION GAP SERPL CALC-SCNC: 11 MMOL/L (ref 7–16)
APPEARANCE UR: ABNORMAL
AST SERPL-CCNC: 21 U/L (ref 12–45)
BACTERIA #/AREA URNS HPF: ABNORMAL /HPF
BARBITURATES UR QL SCN: NEGATIVE
BASOPHILS # BLD AUTO: 0.6 % (ref 0–1.8)
BASOPHILS # BLD: 0.06 K/UL (ref 0–0.12)
BENZODIAZ UR QL SCN: NEGATIVE
BILIRUB SERPL-MCNC: 1 MG/DL (ref 0.1–1.5)
BILIRUB UR QL STRIP.AUTO: NEGATIVE
BUN SERPL-MCNC: 29 MG/DL (ref 8–22)
BZE UR QL SCN: NEGATIVE
CALCIUM ALBUM COR SERPL-MCNC: 9.3 MG/DL (ref 8.5–10.5)
CALCIUM SERPL-MCNC: 9.4 MG/DL (ref 8.5–10.5)
CANNABINOIDS UR QL SCN: NEGATIVE
CASTS URNS QL MICRO: ABNORMAL /LPF (ref 0–2)
CHLORIDE SERPL-SCNC: 105 MMOL/L (ref 96–112)
CO2 SERPL-SCNC: 24 MMOL/L (ref 20–33)
COLOR UR: YELLOW
CREAT SERPL-MCNC: 1.29 MG/DL (ref 0.5–1.4)
EOSINOPHIL # BLD AUTO: 0.38 K/UL (ref 0–0.51)
EOSINOPHIL NFR BLD: 3.8 % (ref 0–6.9)
EPITHELIAL CELLS 1715: ABNORMAL /HPF (ref 0–5)
ERYTHROCYTE [DISTWIDTH] IN BLOOD BY AUTOMATED COUNT: 46.5 FL (ref 35.9–50)
ETHANOL BLD-MCNC: <10.1 MG/DL
FENTANYL UR QL: NEGATIVE
GFR SERPLBLD CREATININE-BSD FMLA CKD-EPI: 46 ML/MIN/1.73 M 2
GLOBULIN SER CALC-MCNC: 3.4 G/DL (ref 1.9–3.5)
GLUCOSE SERPL-MCNC: 101 MG/DL (ref 65–99)
GLUCOSE UR STRIP.AUTO-MCNC: 500 MG/DL
HCT VFR BLD AUTO: 42.6 % (ref 37–47)
HGB BLD-MCNC: 14.4 G/DL (ref 12–16)
IMM GRANULOCYTES # BLD AUTO: 0.05 K/UL (ref 0–0.11)
IMM GRANULOCYTES NFR BLD AUTO: 0.5 % (ref 0–0.9)
KETONES UR STRIP.AUTO-MCNC: ABNORMAL MG/DL
LEUKOCYTE ESTERASE UR QL STRIP.AUTO: ABNORMAL
LIPASE SERPL-CCNC: 38 U/L (ref 11–82)
LYMPHOCYTES # BLD AUTO: 2.77 K/UL (ref 1–4.8)
LYMPHOCYTES NFR BLD: 27.6 % (ref 22–41)
MCH RBC QN AUTO: 30.6 PG (ref 27–33)
MCHC RBC AUTO-ENTMCNC: 33.8 G/DL (ref 32.2–35.5)
MCV RBC AUTO: 90.4 FL (ref 81.4–97.8)
METHADONE UR QL SCN: NEGATIVE
MICRO URNS: ABNORMAL
MONOCYTES # BLD AUTO: 0.54 K/UL (ref 0–0.85)
MONOCYTES NFR BLD AUTO: 5.4 % (ref 0–13.4)
NEUTROPHILS # BLD AUTO: 6.24 K/UL (ref 1.82–7.42)
NEUTROPHILS NFR BLD: 62.1 % (ref 44–72)
NITRITE UR QL STRIP.AUTO: NEGATIVE
NRBC # BLD AUTO: 0 K/UL
NRBC BLD-RTO: 0 /100 WBC (ref 0–0.2)
OPIATES UR QL SCN: NEGATIVE
OXYCODONE UR QL SCN: NEGATIVE
PCP UR QL SCN: NEGATIVE
PH UR STRIP.AUTO: 5.5 [PH] (ref 5–8)
PLATELET # BLD AUTO: 231 K/UL (ref 164–446)
PMV BLD AUTO: 10.2 FL (ref 9–12.9)
POTASSIUM SERPL-SCNC: 4.7 MMOL/L (ref 3.6–5.5)
PROPOXYPH UR QL SCN: NEGATIVE
PROT SERPL-MCNC: 7.5 G/DL (ref 6–8.2)
PROT UR QL STRIP: NEGATIVE MG/DL
RBC # BLD AUTO: 4.71 M/UL (ref 4.2–5.4)
RBC # URNS HPF: ABNORMAL /HPF (ref 0–2)
RBC UR QL AUTO: NEGATIVE
SODIUM SERPL-SCNC: 140 MMOL/L (ref 135–145)
SP GR UR STRIP.AUTO: 1.02
UROBILINOGEN UR STRIP.AUTO-MCNC: 1 EU/DL
WBC # BLD AUTO: 10 K/UL (ref 4.8–10.8)
WBC #/AREA URNS HPF: ABNORMAL /HPF

## 2025-03-27 PROCEDURE — 85025 COMPLETE CBC W/AUTO DIFF WBC: CPT

## 2025-03-27 PROCEDURE — 36415 COLL VENOUS BLD VENIPUNCTURE: CPT

## 2025-03-27 PROCEDURE — 99284 EMERGENCY DEPT VISIT MOD MDM: CPT | Mod: 25

## 2025-03-27 PROCEDURE — RXMED WILLOW AMBULATORY MEDICATION CHARGE: Performed by: EMERGENCY MEDICINE

## 2025-03-27 PROCEDURE — 81001 URINALYSIS AUTO W/SCOPE: CPT | Mod: XU

## 2025-03-27 PROCEDURE — 83690 ASSAY OF LIPASE: CPT

## 2025-03-27 PROCEDURE — 80053 COMPREHEN METABOLIC PANEL: CPT

## 2025-03-27 PROCEDURE — 80307 DRUG TEST PRSMV CHEM ANLYZR: CPT

## 2025-03-27 PROCEDURE — 82077 ASSAY SPEC XCP UR&BREATH IA: CPT

## 2025-03-27 RX ORDER — SULFAMETHOXAZOLE AND TRIMETHOPRIM 800; 160 MG/1; MG/1
1 TABLET ORAL ONCE
Status: COMPLETED | OUTPATIENT
Start: 2025-03-27 | End: 2025-03-28

## 2025-03-27 RX ORDER — SULFAMETHOXAZOLE AND TRIMETHOPRIM 800; 160 MG/1; MG/1
1 TABLET ORAL 2 TIMES DAILY
Qty: 10 TABLET | Refills: 0 | Status: ACTIVE | OUTPATIENT
Start: 2025-03-27 | End: 2025-04-01

## 2025-03-27 ASSESSMENT — LIFESTYLE VARIABLES: DO YOU DRINK ALCOHOL: NO

## 2025-03-27 ASSESSMENT — FIBROSIS 4 INDEX: FIB4 SCORE: 1.08

## 2025-03-28 VITALS
HEIGHT: 62 IN | RESPIRATION RATE: 15 BRPM | HEART RATE: 51 BPM | SYSTOLIC BLOOD PRESSURE: 131 MMHG | BODY MASS INDEX: 31.73 KG/M2 | WEIGHT: 172.4 LBS | OXYGEN SATURATION: 93 % | DIASTOLIC BLOOD PRESSURE: 71 MMHG | TEMPERATURE: 97.8 F

## 2025-03-28 PROCEDURE — 700102 HCHG RX REV CODE 250 W/ 637 OVERRIDE(OP): Performed by: EMERGENCY MEDICINE

## 2025-03-28 PROCEDURE — A9270 NON-COVERED ITEM OR SERVICE: HCPCS | Performed by: EMERGENCY MEDICINE

## 2025-03-28 RX ADMIN — SULFAMETHOXAZOLE AND TRIMETHOPRIM 1 TABLET: 800; 160 TABLET ORAL at 00:05

## 2025-03-28 NOTE — ED TRIAGE NOTES
"Chief Complaint   Patient presents with    Psych Eval     BIB EMS from home; patient talking to self in triage and kept on repeating her words and answer to every question this RN is asking.   Denies any drugs or alcohol today; States she has pain all over.    Maxime any SI/HI    /72   Pulse (!) 52   Temp 36.4 °C (97.5 °F) (Temporal)   Resp 20   Ht 1.575 m (5' 2\")   Wt 78.2 kg (172 lb 6.4 oz)   SpO2 96%   BMI 31.53 kg/m²     "

## 2025-03-28 NOTE — ED PROVIDER NOTES
"ED Provider Note    CHIEF COMPLAINT  Chief Complaint   Patient presents with    Psych Eval       EXTERNAL RECORDS REVIEWED  Prior ER note from 3/22 and the patient was brought in for manic behavior and anxiousness.  She was very tangential, at that time was noted to be on hydroxyzine and Cymbalta, exam and vitals were reassuring, she was having feelings of anxiousness and had been smoking while using a nicotine patch.  She had no evidence of acute psychosis and is established with her psychiatrist.  She was able to be redirected, instructed on the nicotine cessation, and was able to be discharged.    Prior note from 3/14 at the outside [facility] showed that she was brought in for alcohol intoxication and significant anxiety.    HPI/ROS  LIMITATION TO HISTORY   Select: : None  OUTSIDE HISTORIAN(S):  none    Violeta Baez is a 64 y.o. female who presents emergency room with several different complaints.  Initially in triage after being picked up by EMS she was somewhat nondescript about why she came here.  She was oftentimes repeating that \"maybe I have pain\" and\" it hurts only to the bathroom.\"  During my initial assessment the patient states that she sometimes have a problem with her memory because she had a prior extensive traumatic brain injury.  She has her medications on her, she endorses drinking and smoking.  She denies any history of withdrawals.  She is currently denying any recent body aches or chills, no measured fevers.  She has no thoughts of self-harm, she denies seeing or hearing voices, she says that she has had issues with psychiatric illness but does not feel like things are worse at this time.    PAST MEDICAL HISTORY   has a past medical history of Anxiety, Asthma, CHF (congestive heart failure) (HCC), Hashimoto's disease, Hypertension, Hypothyroid, Methamphetamine abuse (HCC), Psychiatric disorder, TBI (traumatic brain injury) ([facility]), and Thyroid cancer " (HCC).    SURGICAL HISTORY   has a past surgical history that includes thyroidectomy; lumpectomy (Right); and tendon repair (Right).    FAMILY HISTORY  Family History   Problem Relation Age of Onset    No Known Problems Mother     Lung Cancer Father     No Known Problems Son     No Known Problems Son        SOCIAL HISTORY  Social History     Tobacco Use    Smoking status: Every Day     Current packs/day: 1.00     Average packs/day: 1 pack/day for 35.0 years (35.0 ttl pk-yrs)     Types: Cigarettes    Smokeless tobacco: Never   Vaping Use    Vaping status: Some Days    Substances: Nicotine   Substance and Sexual Activity    Alcohol use: Not Currently     Alcohol/week: 0.6 oz     Types: 1 Shots of liquor per week     Comment: vodka    Drug use: Yes     Frequency: 2.0 times per week     Comment: meth    Sexual activity: Yes       CURRENT MEDICATIONS  Home Medications       Reviewed by Sujata Alvarez R.N. (Registered Nurse) on 03/27/25 at [employer]  Med List Status: Partial     Medication Last Dose Status   albuterol 108 (90 Base) MCG/ACT Aero Soln inhalation aerosol  Active   carvedilol (COREG) 3.125 MG Tab  Active   ciclopirox (PENLAC) 8 % solution  Active   DULoxetine (CYMBALTA) 30 MG Cap DR Particles  Active   DULoxetine (CYMBALTA) 60 MG Cap DR Particles delayed-release capsule  Active   Empagliflozin (JARDIANCE) 10 MG Tab tablet  Active   hydrOXYzine HCl (ATARAX) 25 MG Tab  Active   levothyroxine (SYNTHROID) 150 MCG Tab  Active   lisinopril-hydrochlorothiazide (PRINZIDE) 20-25 MG per tablet  Active   Melatonin 3 MG Cap  Active   nicotine (NICODERM) 14 MG/24HR PATCH 24 HR  Active   nicotine (NICODERM) 14 MG/24HR PATCH 24 HR  Active   spironolactone (ALDACTONE) 25 MG Tab  Active   thiamine 50 MG tablet  Active   Vitamin D, Ergocalciferol, 57901 units Cap  Active                    ALLERGIES  Allergies   Allergen Reactions    Fluoxetine      Irritability, insomnia       PHYSICAL EXAM  VITAL SIGNS: /71   Pulse (!)  "51   Temp 36.6 °C (97.8 °F) (Temporal)   Resp 15   Ht 1.575 m (5' 2\")   Wt 78.2 kg (172 lb 6.4 oz)   SpO2 93%   BMI 31.53 kg/m²    Genl: F sitting in chair comfortably, speaking clearly, appears in no acute distress   Head: NC/AT   ENT: Mucous membranes moist, posterior pharynx clear, uvula midline, nares patent bilaterally   Eyes: Normal sclera, pupils equal round reactive to light  Pulmonary: Lungs are clear to auscultation bilaterally  Chest: No TTP  CV: bradycardia, no murmur appreciated, pulses 2+ in both upper and lower extremities,  Abdomen: soft, NT/ND; no rebound/guarding, no masses palpated, no HSM   Musculoskeletal: Pain free ROM of the neck. Moving upper and lower extremities in spontaneous and coordinated fashion  Neuro: A&Ox4 (person, place, time, situation), speech fluent, gait steady, no focal deficits appreciated, No cerebellar signs. Sensation is grossly intact in the distal upper and lower extremities.  5/5 strength in  and dorsiflexion/plantar flexion of the ankles  Psych: Patient has a blunted affect, she is mood congruent, otherwise she has an appropriate behavior.  No SI or HI, she is not appear to be trying to internal stimuli.  She has intact insight and judgment.  Skin: No rash or lesions.  No pallor or jaundice.  No cyanosis.  Warm and dry.     EKG/LABS  Labs Reviewed   COMP METABOLIC PANEL - Abnormal; Notable for the following components:       Result Value    Glucose 101 (*)     Bun 29 (*)     All other components within normal limits   URINE DRUG SCREEN - Abnormal; Notable for the following components:    Amphetamines Urine Positive (*)     All other components within normal limits   URINALYSIS - Abnormal; Notable for the following components:    Character Cloudy (*)     Glucose 500 (*)     Ketones Trace (*)     Leukocyte Esterase Moderate (*)     All other components within normal limits   ESTIMATED GFR - Abnormal; Notable for the following components:    GFR (CKD-EPI) 46 " (*)     All other components within normal limits   URINE MICROSCOPIC (W/UA) - Abnormal; Notable for the following components:    WBC 6-10 (*)     Bacteria Many (*)     Epithelial Cells 6-10 (*)     All other components within normal limits   CBC WITH DIFFERENTIAL   LIPASE   DIAGNOSTIC ALCOHOL     RADIOLOGY/PROCEDURES   none    COURSE & MEDICAL DECISION MAKING    ASSESSMENT, COURSE AND PLAN  Care Narrative: Seen evaluated for symptoms as described above.  The patient is not forthright with why she is here and does appear that she was primarily concerned about pain going to the bathroom, a lot of nonspecific changes and on arrival is reassuring to see that she has no hemodynamic instability or febrile illness.  I reviewed her chart and gone back to the bedside to discuss her medication adherence with her Cymbalta and she has her meds and has been compliant.  She is not hearing voices, she does not have active suicidality or homicidality.  She does feel safe at home and would like to be tested for UTI.  Lab work initially started in triage and at the time my assessment lab work is currently pending.    Labwork came back showing no gross electrolyte abnormalities, no anemia or leukocytosis.  No evidence of LFT changes or lipase elevation.  GFR is slightly decreased but creatinine function is normal.  She has some slight leukocyte esterase and bacteria with minimal epithelial cells.  I had a discussion with the patient and with her active dysuria I will treat her I will follow-up on cultures.  Additionally her urine drug screen came back positive for amphetamines and she did endorse using them.  Alcohol level is negative    The patient likely has changes secondary to stimulant use and has an active urinary tract infection with no true encephalopathy.  She has no flank tenderness suggests to suggest progression to pyelonephritis.  There is no evidence of sepsis, no evidence of intra-abdominal infectious etiology at this  time she will be treated with symptomatic medications and discharged home.  She is not in any acute psychotic crisis nor she meet criteria for legal hold      DISPOSITION AND DISCUSSIONS  I have discussed management of the patient with the following physicians and JOSÉ LUIS's:  none    Discussion of management with other QHP or appropriate source(s): None     Escalation of care considered, and ultimately not performed:IV fluids and acute inpatient care management, however at this time, the patient is most appropriate for outpatient management    Barriers to care at this time, including but not limited to: none.     Decision tools and prescription drugs considered including, but not limited to: Antibiotics bactrim    FINAL DIAGNOSIS  1. Methamphetamine abuse (HCC)    2. Acute cystitis without hematuria      Electronically signed by: Eliseo Membreno M.D., 3/27/2025 8:29 PM

## 2025-04-09 ENCOUNTER — OFFICE VISIT (OUTPATIENT)
Dept: CARDIOLOGY | Facility: MEDICAL CENTER | Age: 65
End: 2025-04-09
Attending: STUDENT IN AN ORGANIZED HEALTH CARE EDUCATION/TRAINING PROGRAM
Payer: MEDICARE

## 2025-04-09 VITALS
SYSTOLIC BLOOD PRESSURE: 120 MMHG | HEART RATE: 63 BPM | DIASTOLIC BLOOD PRESSURE: 78 MMHG | OXYGEN SATURATION: 95 % | HEIGHT: 62 IN | WEIGHT: 180 LBS | BODY MASS INDEX: 33.13 KG/M2

## 2025-04-09 DIAGNOSIS — I50.32 CHRONIC HEART FAILURE WITH PRESERVED EJECTION FRACTION (HCC): ICD-10-CM

## 2025-04-09 DIAGNOSIS — I10 ESSENTIAL HYPERTENSION: ICD-10-CM

## 2025-04-09 DIAGNOSIS — I50.9 CHF (NYHA CLASS II, ACC/AHA STAGE C) (HCC): ICD-10-CM

## 2025-04-09 PROCEDURE — 3078F DIAST BP <80 MM HG: CPT | Performed by: STUDENT IN AN ORGANIZED HEALTH CARE EDUCATION/TRAINING PROGRAM

## 2025-04-09 PROCEDURE — 99214 OFFICE O/P EST MOD 30 MIN: CPT | Performed by: STUDENT IN AN ORGANIZED HEALTH CARE EDUCATION/TRAINING PROGRAM

## 2025-04-09 PROCEDURE — 99213 OFFICE O/P EST LOW 20 MIN: CPT | Performed by: STUDENT IN AN ORGANIZED HEALTH CARE EDUCATION/TRAINING PROGRAM

## 2025-04-09 PROCEDURE — 3074F SYST BP LT 130 MM HG: CPT | Performed by: STUDENT IN AN ORGANIZED HEALTH CARE EDUCATION/TRAINING PROGRAM

## 2025-04-09 ASSESSMENT — ENCOUNTER SYMPTOMS
NIGHT SWEATS: 0
PND: 0
FEVER: 0
ABDOMINAL PAIN: 0
IRREGULAR HEARTBEAT: 0
NAUSEA: 0
WEAKNESS: 0
NEAR-SYNCOPE: 0
SHORTNESS OF BREATH: 0
SYNCOPE: 0
PALPITATIONS: 0
WHEEZING: 0
ORTHOPNEA: 0
DIZZINESS: 0
COUGH: 0
DIARRHEA: 0
VOMITING: 0
FOCAL WEAKNESS: 0
DYSPNEA ON EXERTION: 0

## 2025-04-09 ASSESSMENT — FIBROSIS 4 INDEX: FIB4 SCORE: 1.477272727272727273

## 2025-04-09 NOTE — PROGRESS NOTES
Cardiology Follow-up Consultation Note    Date of note:    04/09/25  Primary Care Provider: Alicia Kramer M.D.    Patient Name: Violeta Baez   YOB: 1960  MRN:              [id number]    Chief Complaint: Follow-up HFpEF    History of Present Illness: Ms. Violeta Baez is a 64 y.o. female whose current medical problems include hypertension, history of heart failure, prediabetes, hypertension, tobacco abuse, and anxiety who is here for follow-up HFpEF.      The patient was last seen in my clinic on 10/24/2024 (her initial visit with me).  The patient was initiated on Aldactone during the last visit.  The patient then followed with Sara Rodriguez PA-C, 1/20/2025.  The patient was initiated on Jardiance during the last visit.  The patient was recently seen in the ER in March 2025 for psych eval.    The patient was diagnosed with HFpEF in 2023 when she was hospitalized.      The patient presents today for follow-up. The patient reports feeling well today. She denies any chest pain or shortness of breath on exertion although doesn't exercise.  She does walk a lot.  She denies any orthopnea, PND, or leg swelling.  She denies any palpitations. No syncope or presyncopal episodes.     Of note, the patient reports some anxiety, and does have some pressured speech during this visit.       Cardiovascular Risk Factors:  1. Smoking status: Current everyday smoker  2. Type II Diabetes Mellitus: Prediabetes, diet controlled  Lab Results   Component Value Date/Time    HBA1C 5.7 (H) 03/25/2024 03:19 PM     3. Hypertension: On medication  4. Dyslipidemia: None  Cholesterol,Tot   Date Value Ref Range Status   07/08/2024 174 100 - 199 mg/dL Final     LDL   Date Value Ref Range Status   07/08/2024 89 <100 mg/dL Final     HDL   Date Value Ref Range Status   07/08/2024 72 >=40 mg/dL Final     Triglycerides   Date Value Ref Range Status   07/08/2024 65 0 - 149 mg/dL Final     5. Family history of early  Coronary Artery Disease in a first degree relative (Male less than 55 years of age; Female less than 65 years of age): None  6.  Obesity and/or Metabolic Syndrome: Body mass index is 32.92 kg/m².  7. Sedentary lifestyle: Not active but does walk a lot    Review of Systems   Constitutional: Negative for fever, malaise/fatigue and night sweats.   Cardiovascular:  Negative for chest pain, dyspnea on exertion, irregular heartbeat, leg swelling, near-syncope, orthopnea, palpitations, paroxysmal nocturnal dyspnea and syncope.   Respiratory:  Negative for cough, shortness of breath and wheezing.    Gastrointestinal:  Negative for abdominal pain, diarrhea, nausea and vomiting.   Neurological:  Negative for dizziness, focal weakness and weakness.       All other systems reviewed and are negative.       Current Outpatient Medications   Medication Sig Dispense Refill    thiamine 50 MG tablet Take 1 Tablet by mouth at bedtime for 30 days. 30 Tablet 0    Melatonin 3 MG Cap Take 1 Capsule by mouth at bedtime for 90 days. 30 Capsule 2    nicotine (NICODERM) 14 MG/24HR PATCH 24 HR Place 1 Patch on the skin every 24 hours. 28 Patch 0    DULoxetine (CYMBALTA) 30 MG Cap DR Particles Take 1 Capsule by mouth every day. 90 Capsule 3    DULoxetine (CYMBALTA) 60 MG Cap DR Particles delayed-release capsule Take 1 Capsule by mouth every day for 360 days. 90 Capsule 3    hydrOXYzine HCl (ATARAX) 25 MG Tab Take 1 Tablet by mouth 3 times a day as needed for Anxiety. 180 Tablet 3    levothyroxine (SYNTHROID) 150 MCG Tab Take 1 Tablet by mouth every morning on an empty stomach. 90 Tablet 3    nicotine (NICODERM) 14 MG/24HR PATCH 24 HR Place 1 Patch on the skin every 24 hours. 90 Patch 0    ciclopirox (PENLAC) 8 % solution Apply evenly over entire nail plate nightly to all affected nails. 6.6 mL 3    Empagliflozin (JARDIANCE) 10 MG Tab tablet Take 1 Tablet by mouth every evening. 90 Tablet 3    spironolactone (ALDACTONE) 25 MG Tab Take 1 Tablet by  "mouth every day. 90 Tablet 3    lisinopril-hydrochlorothiazide (PRINZIDE) 20-25 MG per tablet Take 1 Tablet by mouth every day. 90 Tablet 3    carvedilol (COREG) 3.125 MG Tab Take 1 Tablet by mouth 2 times a day with meals. 180 Tablet 3    Vitamin D, Ergocalciferol, 17090 units Cap Take 5,000 Units by mouth every 7 days. 30 Capsule 3    albuterol 108 (90 Base) MCG/ACT Aero Soln inhalation aerosol Inhale 2 Puffs every 6 hours as needed for Shortness of Breath. (Patient not taking: Reported on 4/9/2025) 8.5 g 3     No current facility-administered medications for this visit.         Allergies   Allergen Reactions    Fluoxetine      Irritability, insomnia         Physical Exam:  Ambulatory Vitals  /78 (BP Location: Left arm, Patient Position: Sitting, BP Cuff Size: Adult)   Pulse 63   Ht 1.575 m (5' 2\")   Wt 81.6 kg (180 lb)   SpO2 95%    Oxygen Therapy:  Pulse Oximetry: 95 %  BP Readings from Last 4 Encounters:   04/09/25 120/78   03/28/25 131/71   03/22/25 109/53   03/10/25 138/72       Weight/BMI: Body mass index is 32.92 kg/m².  Wt Readings from Last 4 Encounters:   04/09/25 81.6 kg (180 lb)   03/27/25 78.2 kg (172 lb 6.4 oz)   03/22/25 79.8 kg (176 lb)   03/10/25 80 kg (176 lb 5.9 oz)       General: Anxious and in no apparent distress  Eyes: nl conjunctiva, no icteric sclera  ENT: normal external appearance of ears, nose, and throat  Neck: no visible JVP,  no carotid bruits  Lungs: normal respiratory effort, CTAB  Heart: RRR, no murmurs, no rubs or gallops,  no edema bilateral lower extremities. No LV/RV heave on cardiac palpatation. + bilateral radial pulses.  + bilateral dp pulses.   Abdomen: soft, non tender, non distended, no masses, normal bowel sounds.  No HSM.  Extremities/MSK: no clubbing, no cyanosis  Neurological: No focal sensory deficits  Psychiatric: Appropriate affect, A/O x 3, intact judgement and insight  Skin: Warm extremities      Lab Data Review:  Lab Results   Component Value " Date/Time    CHOLSTRLTOT 174 07/08/2024 06:42 AM    LDL 89 07/08/2024 06:42 AM    HDL 72 07/08/2024 06:42 AM    TRIGLYCERIDE 65 07/08/2024 06:42 AM       Lab Results   Component Value Date/Time    SODIUM 140 03/27/2025 09:38 PM    POTASSIUM 4.7 03/27/2025 09:38 PM    CHLORIDE 105 03/27/2025 09:38 PM    CO2 24 03/27/2025 09:38 PM    GLUCOSE 101 (H) 03/27/2025 09:38 PM    BUN 29 (H) 03/27/2025 09:38 PM    CREATININE 1.29 03/27/2025 09:38 PM    BUNCREATRAT 20.0 06/16/2024 03:46 AM    GLOMRATE 58 (L) 09/26/2022 03:38 AM     Lab Results   Component Value Date/Time    ALKPHOSPHAT 72 03/27/2025 09:38 PM    ASTSGOT 21 03/27/2025 09:38 PM    ALTSGPT 16 03/27/2025 09:38 PM    TBILIRUBIN 1.0 03/27/2025 09:38 PM      Lab Results   Component Value Date/Time    WBC 10.0 03/27/2025 09:38 PM    HEMOGLOBIN 14.4 03/27/2025 09:38 PM     Lab Results   Component Value Date/Time    HBA1C 5.7 (H) 03/25/2024 03:19 PM         Cardiac Imaging and Procedures Review:    EKG dated 2/14/2025: My personal interpretation is Sinus bradycardia    Echo dated 3/31/2024 - from [facility]:   CONCLUSION   The left ventricle is normal size. There is normal left ventricular wall thickness. The left ventricular systolic function is normal. LVEF is 60-65%. The right ventricle is normal size. The right ventricular systolic function is normal. The left atrium size is normal. The right atrium size is normal. No aortic regurgitation is present. There is no aortic valvular stenosis. There is no evidence of significant mitral regurgitation. small  posterior pericardial effusion.         Assessment & Plan     1. Chronic heart failure with preserved ejection fraction (HCC)        2. CHF (NYHA class II, ACC/AHA stage C) (HCC)        3. Essential hypertension                Shared Medical Decision Making:  HFpEF  NYHA Class II Stage C Heart Failure  Euvolemic on exam. Patient had history of heart failure exacerbation in 2023   -Last echo 3/2024 with preserved  LVEF as per records  -Contine HCTZ as below  -Continue aldactone 25 mg daily  -Continue Jardiance 10 mg daily    Hypertension  BP normal this visit  -Continue lisinopril-hydrochlorothiazide  -Continue carvedilol  -Continue aldactone as above      All of the patient's excellent questions were answered to the best of my knowledge and to her satisfaction.  It was a pleasure seeing Ms. Violeta Baez in my clinic today. Return in about 6 months (around 10/9/2025), or if symptoms worsen or fail to improve. Patient is aware to call the cardiology clinic with any questions or concerns.      Darci Solorzano MD  [facility]  [place] [facility], [address]  [address] [address]  Phone: [phone]  Fax: [phone]

## 2025-04-12 ENCOUNTER — HOSPITAL ENCOUNTER (EMERGENCY)
Facility: MEDICAL CENTER | Age: 65
End: 2025-04-12
Attending: STUDENT IN AN ORGANIZED HEALTH CARE EDUCATION/TRAINING PROGRAM
Payer: MEDICARE

## 2025-04-12 VITALS
BODY MASS INDEX: 32.54 KG/M2 | DIASTOLIC BLOOD PRESSURE: 58 MMHG | HEIGHT: 62 IN | SYSTOLIC BLOOD PRESSURE: 101 MMHG | TEMPERATURE: 97 F | RESPIRATION RATE: 16 BRPM | HEART RATE: 57 BPM | WEIGHT: 176.81 LBS | OXYGEN SATURATION: 93 %

## 2025-04-12 DIAGNOSIS — K08.89 TOOTH PAIN: ICD-10-CM

## 2025-04-12 PROCEDURE — A9270 NON-COVERED ITEM OR SERVICE: HCPCS | Performed by: STUDENT IN AN ORGANIZED HEALTH CARE EDUCATION/TRAINING PROGRAM

## 2025-04-12 PROCEDURE — 99283 EMERGENCY DEPT VISIT LOW MDM: CPT

## 2025-04-12 PROCEDURE — 700102 HCHG RX REV CODE 250 W/ 637 OVERRIDE(OP): Performed by: STUDENT IN AN ORGANIZED HEALTH CARE EDUCATION/TRAINING PROGRAM

## 2025-04-12 RX ORDER — ACETAMINOPHEN 325 MG/1
650 TABLET ORAL ONCE
Status: COMPLETED | OUTPATIENT
Start: 2025-04-12 | End: 2025-04-12

## 2025-04-12 RX ADMIN — ACETAMINOPHEN 650 MG: 325 TABLET ORAL at 01:57

## 2025-04-12 ASSESSMENT — FIBROSIS 4 INDEX: FIB4 SCORE: 1.477272727272727273

## 2025-04-12 NOTE — ED TRIAGE NOTES
"  Chief Complaint   Patient presents with    Other     Pt presents to triage via EMS. Upon arrival when asked what brought pt to the ED pt stated \"I don't know, I can't remember.\" Pt then endorsed needing multiple rings cut off multiple different fingers. Pt states \"they're all stuck I can;t get them off.\" Py denies rings causing any pain at this time. CMS intact, no signs of any constriction.     Pt is alert/oriented, following commands, and answering questions appropriately. Pt placed in lobby and educated on triage process. Pt encouraged to alert staff for any changes in condition.    ./72   Pulse 65   Temp 35.9 °C (96.6 °F) (Temporal)   Resp 16   Ht 1.575 m (5' 2\")   Wt 80.2 kg (176 lb 12.9 oz)   SpO2 98%   BMI 32.34 kg/m²     "

## 2025-04-12 NOTE — ED NOTES
Bus pass given by   Pt discharged to home. Discharge paperwork provided. Education provided by ERP. Reinforced discharge instructions.  Pt was given follow up instructions  Pt verbalized understanding of all instructions for discharge.   Patient went out of the ER ambulatory with steady gait., alert and oriented x 4, with all belongings.
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
## 2025-04-16 ENCOUNTER — OFFICE VISIT (OUTPATIENT)
Dept: BEHAVIORAL HEALTH | Facility: CLINIC | Age: 65
End: 2025-04-16
Payer: COMMERCIAL

## 2025-04-16 ENCOUNTER — APPOINTMENT (OUTPATIENT)
Dept: RADIOLOGY | Facility: MEDICAL CENTER | Age: 65
End: 2025-04-16
Attending: EMERGENCY MEDICINE
Payer: MEDICARE

## 2025-04-16 ENCOUNTER — HOSPITAL ENCOUNTER (EMERGENCY)
Facility: MEDICAL CENTER | Age: 65
End: 2025-04-16
Attending: EMERGENCY MEDICINE
Payer: MEDICARE

## 2025-04-16 VITALS
HEIGHT: 62 IN | OXYGEN SATURATION: 91 % | RESPIRATION RATE: 16 BRPM | SYSTOLIC BLOOD PRESSURE: 128 MMHG | HEART RATE: 51 BPM | WEIGHT: 180 LBS | BODY MASS INDEX: 33.13 KG/M2 | DIASTOLIC BLOOD PRESSURE: 68 MMHG | TEMPERATURE: 97.8 F

## 2025-04-16 DIAGNOSIS — F41.9 ANXIETY: ICD-10-CM

## 2025-04-16 DIAGNOSIS — F32.A DEPRESSIVE DISORDER: ICD-10-CM

## 2025-04-16 DIAGNOSIS — R41.89 OTHER SYMPTOMS AND SIGNS INVOLVING COGNITIVE FUNCTIONS AND AWARENESS: ICD-10-CM

## 2025-04-16 DIAGNOSIS — F43.10 PTSD (POST-TRAUMATIC STRESS DISORDER): ICD-10-CM

## 2025-04-16 DIAGNOSIS — F41.1 GAD (GENERALIZED ANXIETY DISORDER): Primary | ICD-10-CM

## 2025-04-16 DIAGNOSIS — F19.10 POLYSUBSTANCE ABUSE (HCC): ICD-10-CM

## 2025-04-16 DIAGNOSIS — F19.10 SUBSTANCE ABUSE (HCC): ICD-10-CM

## 2025-04-16 DIAGNOSIS — F17.200 SMOKING ADDICTION: ICD-10-CM

## 2025-04-16 LAB
ALBUMIN SERPL BCP-MCNC: 3.9 G/DL (ref 3.2–4.9)
ALBUMIN/GLOB SERPL: 1.2 G/DL
ALP SERPL-CCNC: 72 U/L (ref 30–99)
ALT SERPL-CCNC: 22 U/L (ref 2–50)
ANION GAP SERPL CALC-SCNC: 11 MMOL/L (ref 7–16)
AST SERPL-CCNC: 18 U/L (ref 12–45)
BASOPHILS # BLD AUTO: 0.7 % (ref 0–1.8)
BASOPHILS # BLD: 0.05 K/UL (ref 0–0.12)
BILIRUB SERPL-MCNC: 0.8 MG/DL (ref 0.1–1.5)
BUN SERPL-MCNC: 29 MG/DL (ref 8–22)
CALCIUM ALBUM COR SERPL-MCNC: 9.3 MG/DL (ref 8.5–10.5)
CALCIUM SERPL-MCNC: 9.2 MG/DL (ref 8.5–10.5)
CHLORIDE SERPL-SCNC: 103 MMOL/L (ref 96–112)
CO2 SERPL-SCNC: 26 MMOL/L (ref 20–33)
CREAT SERPL-MCNC: 1.37 MG/DL (ref 0.5–1.4)
EKG IMPRESSION: NORMAL
EOSINOPHIL # BLD AUTO: 0.27 K/UL (ref 0–0.51)
EOSINOPHIL NFR BLD: 4 % (ref 0–6.9)
ERYTHROCYTE [DISTWIDTH] IN BLOOD BY AUTOMATED COUNT: 45 FL (ref 35.9–50)
GFR SERPLBLD CREATININE-BSD FMLA CKD-EPI: 43 ML/MIN/1.73 M 2
GLOBULIN SER CALC-MCNC: 3.2 G/DL (ref 1.9–3.5)
GLUCOSE SERPL-MCNC: 110 MG/DL (ref 65–99)
HCT VFR BLD AUTO: 41.5 % (ref 37–47)
HGB BLD-MCNC: 13.9 G/DL (ref 12–16)
IMM GRANULOCYTES # BLD AUTO: 0.01 K/UL (ref 0–0.11)
IMM GRANULOCYTES NFR BLD AUTO: 0.1 % (ref 0–0.9)
LYMPHOCYTES # BLD AUTO: 2.47 K/UL (ref 1–4.8)
LYMPHOCYTES NFR BLD: 36.2 % (ref 22–41)
MCH RBC QN AUTO: 30.5 PG (ref 27–33)
MCHC RBC AUTO-ENTMCNC: 33.5 G/DL (ref 32.2–35.5)
MCV RBC AUTO: 91 FL (ref 81.4–97.8)
MONOCYTES # BLD AUTO: 0.6 K/UL (ref 0–0.85)
MONOCYTES NFR BLD AUTO: 8.8 % (ref 0–13.4)
NEUTROPHILS # BLD AUTO: 3.43 K/UL (ref 1.82–7.42)
NEUTROPHILS NFR BLD: 50.2 % (ref 44–72)
NRBC # BLD AUTO: 0 K/UL
NRBC BLD-RTO: 0 /100 WBC (ref 0–0.2)
NT-PROBNP SERPL IA-MCNC: 43 PG/ML (ref 0–125)
PLATELET # BLD AUTO: 263 K/UL (ref 164–446)
PMV BLD AUTO: 9.9 FL (ref 9–12.9)
POTASSIUM SERPL-SCNC: 4.3 MMOL/L (ref 3.6–5.5)
PROT SERPL-MCNC: 7.1 G/DL (ref 6–8.2)
RBC # BLD AUTO: 4.56 M/UL (ref 4.2–5.4)
SODIUM SERPL-SCNC: 140 MMOL/L (ref 135–145)
T4 FREE SERPL-MCNC: 1.42 NG/DL (ref 0.93–1.7)
TROPONIN T SERPL-MCNC: 16 NG/L (ref 6–19)
TSH SERPL DL<=0.005 MIU/L-ACNC: 6.82 UIU/ML (ref 0.38–5.33)
WBC # BLD AUTO: 6.8 K/UL (ref 4.8–10.8)

## 2025-04-16 PROCEDURE — 36415 COLL VENOUS BLD VENIPUNCTURE: CPT

## 2025-04-16 PROCEDURE — 84439 ASSAY OF FREE THYROXINE: CPT

## 2025-04-16 PROCEDURE — 96133 NRPSYC TST EVAL PHYS/QHP EA: CPT | Performed by: COUNSELOR

## 2025-04-16 PROCEDURE — 99283 EMERGENCY DEPT VISIT LOW MDM: CPT

## 2025-04-16 PROCEDURE — 83880 ASSAY OF NATRIURETIC PEPTIDE: CPT

## 2025-04-16 PROCEDURE — 84484 ASSAY OF TROPONIN QUANT: CPT

## 2025-04-16 PROCEDURE — 71045 X-RAY EXAM CHEST 1 VIEW: CPT

## 2025-04-16 PROCEDURE — 96132 NRPSYC TST EVAL PHYS/QHP 1ST: CPT | Performed by: COUNSELOR

## 2025-04-16 PROCEDURE — 93005 ELECTROCARDIOGRAM TRACING: CPT | Mod: TC | Performed by: EMERGENCY MEDICINE

## 2025-04-16 PROCEDURE — 85025 COMPLETE CBC W/AUTO DIFF WBC: CPT

## 2025-04-16 PROCEDURE — 80053 COMPREHEN METABOLIC PANEL: CPT

## 2025-04-16 PROCEDURE — 99406 BEHAV CHNG SMOKING 3-10 MIN: CPT

## 2025-04-16 PROCEDURE — 84443 ASSAY THYROID STIM HORMONE: CPT

## 2025-04-16 RX ORDER — VARENICLINE TARTRATE 0.5 (11)-1
0.5 KIT ORAL DAILY
Qty: 53 EACH | Refills: 0 | Status: SHIPPED | OUTPATIENT
Start: 2025-04-16

## 2025-04-16 RX ORDER — HYDROXYZINE HYDROCHLORIDE 25 MG/1
50 TABLET, FILM COATED ORAL 3 TIMES DAILY PRN
Qty: 30 TABLET | Refills: 0 | Status: SHIPPED | OUTPATIENT
Start: 2025-04-16

## 2025-04-16 ASSESSMENT — FIBROSIS 4 INDEX: FIB4 SCORE: 1.477272727272727273

## 2025-04-16 NOTE — ED NOTES
Pt discharged home as ordered by erp. Pt instructed to follow up with their PCP and return here as need. Pt instructed on where to  RXs. Pt verbalized understanding and left ambulating independently

## 2025-04-16 NOTE — ED PROVIDER NOTES
ED Provider Note    CHIEF COMPLAINT  Chief Complaint   Patient presents with    Anxiety     BIBA from home by unit 6 for anxiety.  Hx TBI per EMS       EXTERNAL RECORDS REVIEWED  Urine drug screen positive for methamphetamine March 27, 2025    HPI/ROS      Violeta Baez is a 64 y.o. female who presents with chief complaint of anxiety.  Patient with longstanding history of anxiety and depression.  Patient with longstanding history of polysubstance abuse including methamphetamine use.  Patient recently seen here for similar.  Patient reports that she continues to use methamphetamine, she refused to answer what her last use was.  She states she called EMS because she wanted to quit smoking and was feeling unwell.  On further questioning into this patient is unsure exactly what she means by feeling unwell.  She states she feels anxious.  She denies any significant chest pain or decreased exertional tolerance.  She does report some mild shortness of breath.  Patient denies any lower extremity edema.  She denies any fevers or chills.  She denies any nausea or vomiting.    PAST MEDICAL HISTORY   has a past medical history of Anxiety, Asthma, CHF (congestive heart failure) (HCC), Hashimoto's disease, Hypertension, Hypothyroid, Methamphetamine abuse (HCC), Psychiatric disorder, TBI (traumatic brain injury) (HCC), and Thyroid cancer (HCC).    SURGICAL HISTORY   has a past surgical history that includes thyroidectomy; lumpectomy (Right); and tendon repair (Right).    FAMILY HISTORY  Family History   Problem Relation Age of Onset    No Known Problems Mother     Lung Cancer Father     No Known Problems Son     No Known Problems Son        SOCIAL HISTORY  Social History     Tobacco Use    Smoking status: Every Day     Current packs/day: 1.00     Average packs/day: 1 pack/day for 35.0 years (35.0 ttl pk-yrs)     Types: Cigarettes    Smokeless tobacco: Current   Vaping Use    Vaping status: Some Days    Substances: Nicotine  "  Substance and Sexual Activity    Alcohol use: Yes     Alcohol/week: 0.6 oz     Types: 1 Shots of liquor per week     Comment: vodka    Drug use: Yes     Frequency: 2.0 times per week     Comment: marijauna, meth    Sexual activity: Yes       CURRENT MEDICATIONS  Home Medications       Reviewed by Jani Hanson R.N. (Registered Nurse) on 04/16/25 at 0518  Med List Status: Partial     Medication Last Dose Status   albuterol 108 (90 Base) MCG/ACT Aero Soln inhalation aerosol  Active   carvedilol (COREG) 3.125 MG Tab  Active   ciclopirox (PENLAC) 8 % solution  Active   DULoxetine (CYMBALTA) 30 MG Cap DR Particles  Active   DULoxetine (CYMBALTA) 60 MG Cap DR Particles delayed-release capsule  Active   Empagliflozin (JARDIANCE) 10 MG Tab tablet  Active   hydrOXYzine HCl (ATARAX) 25 MG Tab  Active   levothyroxine (SYNTHROID) 150 MCG Tab  Active   lisinopril-hydrochlorothiazide (PRINZIDE) 20-25 MG per tablet  Active   Melatonin 3 MG Cap  Active   nicotine (NICODERM) 14 MG/24HR PATCH 24 HR  Active   nicotine (NICODERM) 14 MG/24HR PATCH 24 HR  Active   spironolactone (ALDACTONE) 25 MG Tab  Active   Vitamin D, Ergocalciferol, 41259 units Cap  Active                    ALLERGIES  Allergies   Allergen Reactions    Fluoxetine      Irritability, insomnia       PHYSICAL EXAM  VITAL SIGNS: /70   Pulse (!) 53   Temp 36.6 °C (97.9 °F) (Temporal)   Resp 16   Ht 1.575 m (5' 2\")   Wt 81.6 kg (180 lb)   SpO2 92%   BMI 32.92 kg/m²    Physical Exam  Constitutional:       Appearance: She is well-developed.   HENT:      Head: Normocephalic and atraumatic.   Eyes:      Pupils: Pupils are equal, round, and reactive to light.   Cardiovascular:      Rate and Rhythm: Normal rate and regular rhythm.   Pulmonary:      Effort: Pulmonary effort is normal. No accessory muscle usage or respiratory distress.      Breath sounds: Normal breath sounds.   Abdominal:      General: Bowel sounds are normal.      Palpations: Abdomen is soft. " There is no mass.      Tenderness: There is no abdominal tenderness.   Musculoskeletal:         General: Normal range of motion.   Skin:     General: Skin is warm.      Capillary Refill: Capillary refill takes less than 2 seconds.   Neurological:      General: No focal deficit present.      Mental Status: She is alert and oriented to person, place, and time.      Cranial Nerves: No cranial nerve deficit.      Sensory: No sensory deficit.      Motor: No weakness.      Comments: Akathetic   Psychiatric:         Mood and Affect: Mood is not anxious.      Comments: Anxious           EKG/LABS  Results for orders placed or performed during the hospital encounter of 04/16/25   CBC WITH DIFFERENTIAL    Collection Time: 04/16/25  6:08 AM   Result Value Ref Range    WBC 6.8 4.8 - 10.8 K/uL    RBC 4.56 4.20 - 5.40 M/uL    Hemoglobin 13.9 12.0 - 16.0 g/dL    Hematocrit 41.5 37.0 - 47.0 %    MCV 91.0 81.4 - 97.8 fL    MCH 30.5 27.0 - 33.0 pg    MCHC 33.5 32.2 - 35.5 g/dL    RDW 45.0 35.9 - 50.0 fL    Platelet Count 263 164 - 446 K/uL    MPV 9.9 9.0 - 12.9 fL    Neutrophils-Polys 50.20 44.00 - 72.00 %    Lymphocytes 36.20 22.00 - 41.00 %    Monocytes 8.80 0.00 - 13.40 %    Eosinophils 4.00 0.00 - 6.90 %    Basophils 0.70 0.00 - 1.80 %    Immature Granulocytes 0.10 0.00 - 0.90 %    Nucleated RBC 0.00 0.00 - 0.20 /100 WBC    Neutrophils (Absolute) 3.43 1.82 - 7.42 K/uL    Lymphs (Absolute) 2.47 1.00 - 4.80 K/uL    Monos (Absolute) 0.60 0.00 - 0.85 K/uL    Eos (Absolute) 0.27 0.00 - 0.51 K/uL    Baso (Absolute) 0.05 0.00 - 0.12 K/uL    Immature Granulocytes (abs) 0.01 0.00 - 0.11 K/uL    NRBC (Absolute) 0.00 K/uL   CMP    Collection Time: 04/16/25  6:08 AM   Result Value Ref Range    Sodium 140 135 - 145 mmol/L    Potassium 4.3 3.6 - 5.5 mmol/L    Chloride 103 96 - 112 mmol/L    Co2 26 20 - 33 mmol/L    Anion Gap 11.0 7.0 - 16.0    Glucose 110 (H) 65 - 99 mg/dL    Bun 29 (H) 8 - 22 mg/dL    Creatinine 1.37 0.50 - 1.40 mg/dL     Calcium 9.2 8.5 - 10.5 mg/dL    Correct Calcium 9.3 8.5 - 10.5 mg/dL    AST(SGOT) 18 12 - 45 U/L    ALT(SGPT) 22 2 - 50 U/L    Alkaline Phosphatase 72 30 - 99 U/L    Total Bilirubin 0.8 0.1 - 1.5 mg/dL    Albumin 3.9 3.2 - 4.9 g/dL    Total Protein 7.1 6.0 - 8.2 g/dL    Globulin 3.2 1.9 - 3.5 g/dL    A-G Ratio 1.2 g/dL   TROPONIN    Collection Time: 25  6:08 AM   Result Value Ref Range    Troponin T 16 6 - 19 ng/L   proBrain Natriuretic Peptide, NT    Collection Time: 25  6:08 AM   Result Value Ref Range    NT-proBNP 43 0 - 125 pg/mL   TSH WITH REFLEX TO FT4    Collection Time: 25  6:08 AM   Result Value Ref Range    TSH 6.820 (H) 0.380 - 5.330 uIU/mL   FREE THYROXINE    Collection Time: 25  6:08 AM   Result Value Ref Range    Free T-4 1.42 0.93 - 1.70 ng/dL   ESTIMATED GFR    Collection Time: 25  6:08 AM   Result Value Ref Range    GFR (CKD-EPI) 43 (A) >60 mL/min/1.73 m 2   EKG    Collection Time: 25  6:12 AM   Result Value Ref Range    Report       [facility] Emergency Dept.    Test Date:  2025  Pt Name:    VIKI ESTEVEZ             Department: ER  MRN:        [id number]                      Room:        02  Gender:     Female                       Technician: [id number]  :        1960                   Requested By:GINO DAVENPORT  Order #:    [id number]                    Reading MD:    Measurements  Intervals                                Axis  Rate:       48                           P:          50  CA:         184                          QRS:        -5  QRSD:       104                          T:          30  QT:         418  QTc:        374    Interpretive Statements  Sinus bradycardia  Compared to ECG 2025 15:49:04  No significant changes         I have independently interpreted this EKG    RADIOLOGY/PROCEDURES   I have independently interpreted the diagnostic imaging associated with this visit and am waiting the final reading from  the radiologist.   My preliminary interpretation is as follows: Chest x-ray is unremarkable    Radiologist interpretation:  DX-CHEST-PORTABLE (1 VIEW)   Final Result         1.  No acute cardiopulmonary disease.            COURSE & MEDICAL DECISION MAKING    ASSESSMENT, COURSE AND PLAN  Care Narrative: Patient here, with anxiety, stating she wants to quit smoking.  We did discuss smoking cessation and I will send patient home on Chantix.  Will workup patient for her vague symptoms of not feeling well with some basic labs, troponin, BNP and a TSH given her history of Hashimoto's.  My suspicion of primary vascular pulmonary etiology is low here, I do believe that likely sympathomimetic drug abuse is contributing.  Patient basic labs are all very reassuring.  Patient feeling much improved following treatment here.  Patient without any concerning vitals upon discharge.  She has no ongoing complaints.  Patient discharged in good condition.  I spent greater than 3 minutes with the patient explaining the importance of smoking cessation.                 FINAL DIAGNOSIS  1. Anxiety  hydrOXYzine HCl (ATARAX) 25 MG Tab      2. Polysubstance abuse (HCC)        3. Smoking addiction  Varenicline Tartrate, Starter, 0.5 MG X 11 & 1 MG X 42 Tablet Therapy Pack

## 2025-04-16 NOTE — PROGRESS NOTES
INTERACTIVE NEUROPSYCHOLOGICAL FEEDBACK SESSION  Name: Violeta Baez    MRN: [id number]   YOB: 1960   Date of Feedback: 4/16/25  Referred by: Humphrey Lomax MD  Evaluated by: Jorge Hernandez Psy.D.      IDENTIFYING AND REFERRAL INFORMATION                                                The patient is a 64 y.o., right-handed woman previously seen on 3/17/25 for a neuropsychological evaluation to assess cognitive functioning and assist with diagnostic impressions and treatment recommendations. Please see full neuropsychological assessment report dated 3/17/25 for specifics regarding summary, diagnosis, and recommendations.        BRIEF DESCRIPTION OF SESSION:  Met with Violeta Baez to review assessment results, diagnostic impressions, and recommendations. The patient demonstrated understanding of the evaluation results and agreement with the recommendations. The patient was afforded time to ask questions and all her questions were addressed.    Communicated that results were consistent with unspecified neurocognitive disorder. Etiology of her current cognitive difficulties is unclear and confounded by psychiatric disturbances and substance abuse history.  Intensive psychiatric and substance abuse including psychologic and pharmacologic treatment is highly recommended and a referral for [facility] was placed. The patient indicated that she cannot be seen by Dr. Lomax due to number of no-shows. She needs to reestablish care with a psychiatrist. She continues to deny SI. Resources for the [place] warm line were provided in an effort to minimize ER visits (see chart).     The patient indicated that she will contact me with any additional questions or concerns.     Referrals placed: [facility].      Jorge Hernandez Psy.D.        Clinical Neuropsychologist          Department of Behavioral Health          [facility]        Neuropsychological Services Provided Code Total Units Date/Time    Professional Neuropsychological Testing Evaluation Services         Hour 1 99341 1  3/17 9-1001       Each Additional Hour  05007 4  3/17 1-415; 4/16 *   * 31 minutes was spent today (4/16/25) delivering interactive feedback to the patient, which is included in the charges above (17721 = 1).

## 2025-04-16 NOTE — ED TRIAGE NOTES
Chief Complaint   Patient presents with    Anxiety     BIBA from home by unit 6 for anxiety.  Hx TBI per EMS     Vitals:    04/16/25 0516   BP: 115/72   Pulse: (!) 56   Resp: 16   Temp: 36.6 °C (97.9 °F)   SpO2: 94%

## 2025-04-16 NOTE — DISCHARGE INSTRUCTIONS
Your workup is reassuring.  I prescribed some Chantix, you can continue using over-the-counter nicotine patches as well to help you quit smoking.  Stop using any methamphetamine as this will make your anxiety significantly worse.  You will follow-up with [facility] for help with addiction.  Your workup today was reassuring.

## 2025-04-17 NOTE — Clinical Note
REFERRAL APPROVAL NOTICE         Sent on April 17, 2025                   Violeta Sophia Baez  [address] [place] [id number]                   Dear Ms. Baez,    After a careful review of the medical information and benefit coverage, [facility] has processed your referral. See below for additional details.    If applicable, you must be actively enrolled with your insurance for coverage of the authorized service. If you have any questions regarding your coverage, please contact your insurance directly.    REFERRAL INFORMATION   Referral #:  [id number]  Referred-To Department    Referred-By Provider:  Behavioral Health    Jorge Hernandez PsyD   [facility]      [address]  [place] [id number] [phone] [address], [address]  [place] [id number] [phone]    Referral Start Date:  04/17/2025  Referral End Date:   04/17/2026           SCHEDULING  If you do not already have an appointment, please call [phone] to make an appointment.   MORE INFORMATION  As a reminder, [facility] ownership has changed, meaning this location is now owned and operated by [facility]. As such, we want to clarify that our patients should expect to receive two separate bills for the services received at [facility] - one representing the [facility] facility fees as the owner of the establishment, and the other to represent the physician's services and subsequent fees. You can speak with your insurance carrier for a pricing estimate by calling the customer service number on the back of your card and ask about charges for a hospital outpatient visit.  If you do not already have a [email or web address] account, sign up at: [email or web address]  You can access your medical information, make appointments, see lab results, billing information, and more.  If you have questions regarding this referral, please contact  the [facility] [place] [facility] Referrals department at:              [phone]. Monday - Friday 7:30AM - 5:00PM.      Sincerely,  [facility]

## 2025-04-27 ENCOUNTER — HOSPITAL ENCOUNTER (EMERGENCY)
Facility: MEDICAL CENTER | Age: 65
End: 2025-04-27
Attending: EMERGENCY MEDICINE
Payer: MEDICARE

## 2025-04-27 VITALS
WEIGHT: 171.96 LBS | HEART RATE: 60 BPM | OXYGEN SATURATION: 96 % | DIASTOLIC BLOOD PRESSURE: 70 MMHG | SYSTOLIC BLOOD PRESSURE: 121 MMHG | TEMPERATURE: 97.2 F | BODY MASS INDEX: 31.45 KG/M2 | RESPIRATION RATE: 16 BRPM

## 2025-04-27 DIAGNOSIS — F41.9 ANXIETY: ICD-10-CM

## 2025-04-27 PROCEDURE — A9270 NON-COVERED ITEM OR SERVICE: HCPCS | Performed by: EMERGENCY MEDICINE

## 2025-04-27 PROCEDURE — 99284 EMERGENCY DEPT VISIT MOD MDM: CPT

## 2025-04-27 PROCEDURE — 700102 HCHG RX REV CODE 250 W/ 637 OVERRIDE(OP): Performed by: EMERGENCY MEDICINE

## 2025-04-27 RX ORDER — LORAZEPAM 1 MG/1
1 TABLET ORAL EVERY 8 HOURS PRN
Qty: 6 TABLET | Refills: 0 | Status: SHIPPED | OUTPATIENT
Start: 2025-04-27 | End: 2025-04-30

## 2025-04-27 RX ORDER — LORAZEPAM 1 MG/1
0.5 TABLET ORAL ONCE
Status: COMPLETED | OUTPATIENT
Start: 2025-04-27 | End: 2025-04-27

## 2025-04-27 RX ADMIN — LORAZEPAM 0.5 MG: 1 TABLET ORAL at 18:01

## 2025-04-27 ASSESSMENT — FIBROSIS 4 INDEX: FIB4 SCORE: 0.95

## 2025-04-27 NOTE — ED TRIAGE NOTES
Pt bib ems c/o anxiety. Pt keeps asking if she is in trouble and going to [facility]. Pt appears anxious and needs lots of reassurance that she is not in fact in trouble. Vss

## 2025-04-28 NOTE — ED NOTES
"Ambulated to room steady gait. Pt has hx methamphetamine use. States \"not today\" pt seen frequently in ed.   "

## 2025-04-28 NOTE — ED PROVIDER NOTES
ED Provider Note    CHIEF COMPLAINT  Chief Complaint   Patient presents with    Anxiety       EXTERNAL RECORDS REVIEWED  Outpatient Notes   cardiology, behavioral health    HPI/ROS  LIMITATION TO HISTORY   Select: : None  OUTSIDE HISTORIAN(S):  None    Violeta Baez is a 64 y.o. female who presents here for evaluation of anxiety.  Patient states that she had some earlier events today triggered some anxiety, but she has no complaints of chest pain shortness of breath or abdominal pain.  No headache, no fall or trauma.  She has no fever chills or vomiting.  Patient has no SI or HI reports.    PAST MEDICAL HISTORY   has a past medical history of Anxiety, Asthma, CHF (congestive heart failure) (HCC), Hashimoto's disease, Hypertension, Hypothyroid, Methamphetamine abuse (HCC), Psychiatric disorder, TBI (traumatic brain injury) (HCC), and Thyroid cancer (HCC).    SURGICAL HISTORY   has a past surgical history that includes thyroidectomy; lumpectomy (Right); and tendon repair (Right).    FAMILY HISTORY  Family History   Problem Relation Age of Onset    No Known Problems Mother     Lung Cancer Father     No Known Problems Son     No Known Problems Son        SOCIAL HISTORY  Social History     Tobacco Use    Smoking status: Every Day     Current packs/day: 1.00     Average packs/day: 1 pack/day for 35.0 years (35.0 ttl pk-yrs)     Types: Cigarettes    Smokeless tobacco: Current   Vaping Use    Vaping status: Some Days    Substances: Nicotine   Substance and Sexual Activity    Alcohol use: Yes     Alcohol/week: 0.6 oz     Types: 1 Shots of liquor per week     Comment: vodka    Drug use: Yes     Frequency: 2.0 times per week     Comment: marijauna, meth    Sexual activity: Yes       CURRENT MEDICATIONS  Home Medications       Reviewed by Sendy Coker R.N. (Registered Nurse) on 04/27/25 at 1650  Med List Status: Partial     Medication Last Dose Status   albuterol 108 (90 Base) MCG/ACT Aero Soln inhalation aerosol   Active   carvedilol (COREG) 3.125 MG Tab  Active   ciclopirox (PENLAC) 8 % solution  Active   DULoxetine (CYMBALTA) 30 MG Cap DR Particles  Active   DULoxetine (CYMBALTA) 60 MG Cap DR Particles delayed-release capsule  Active   Empagliflozin (JARDIANCE) 10 MG Tab tablet  Active   hydrOXYzine HCl (ATARAX) 25 MG Tab  Active   levothyroxine (SYNTHROID) 150 MCG Tab  Active   lisinopril-hydrochlorothiazide (PRINZIDE) 20-25 MG per tablet  Active   Melatonin 3 MG Cap  Active   nicotine (NICODERM) 14 MG/24HR PATCH 24 HR  Active   nicotine (NICODERM) 14 MG/24HR PATCH 24 HR  Active   spironolactone (ALDACTONE) 25 MG Tab  Active   Varenicline Tartrate, Starter, 0.5 MG X 11 & 1 MG X 42 Tablet Therapy Pack  Active   Vitamin D, Ergocalciferol, 39635 units Cap  Active                    ALLERGIES  Allergies   Allergen Reactions    Fluoxetine      Irritability, insomnia       PHYSICAL EXAM  VITAL SIGNS: /70   Pulse 60   Temp 36.2 °C (97.2 °F) (Temporal)   Resp 16   Wt 78 kg (171 lb 15.3 oz)   SpO2 96%   BMI 31.45 kg/m²    Constitutional: Well developed, well nourished. No acute distress.  HEENT: Normocephalic, atraumatic. Posterior pharynx clear and moist.  Eyes:  EOMI. Normal sclera.  Neck: Supple, Full range of motion, nontender.  Chest/Pulmonary: clear to ausculation. Symmetrical expansion.   Cardio: Regular rate and rhythm with no murmur.   Abdomen: Soft, nontender. No peritoneal signs. No guarding. No palpable masses.  Musculoskeletal: No deformity, no edema, neurovascular intact.   Neuro: Clear speech, appropriate, cooperative, cranial nerves II-XII grossly intact.  Psych: flat mood and affect      EKG/LABS  None    RADIOLOGY/PROCEDURES   none      COURSE & MEDICAL DECISION MAKING    ASSESSMENT, COURSE AND PLAN  Care Narrative: This is a 64-year-old female here for evaluation of anxiety.  Patient states that she has had anxiety over the last couple of days, from an event that she does not want to talk about.  She  has no medical complaints, no SI or HI.  And no other medical concerns at this time.  To be discharged home in stable condition        DISPOSITION AND DISCUSSIONS  I have discussed management of the patient with the following physicians and JOSÉ LUIS's: None    Discussion of management with other QHP or appropriate source(s): None    Escalation of care considered, and ultimately not performed: None    Barriers to care at this time, including but not limited to: None.     Decision tools and prescription drugs considered including, but not limited to: None.    FINAL DIAGNOSIS  1. Anxiety         Electronically signed by: Bib Dumont D.O., 4/27/2025 5:37 PM

## 2025-05-11 ENCOUNTER — HOSPITAL ENCOUNTER (EMERGENCY)
Facility: MEDICAL CENTER | Age: 65
End: 2025-05-11
Attending: EMERGENCY MEDICINE
Payer: MEDICARE

## 2025-05-11 VITALS
BODY MASS INDEX: 33.13 KG/M2 | DIASTOLIC BLOOD PRESSURE: 70 MMHG | OXYGEN SATURATION: 95 % | HEIGHT: 62 IN | RESPIRATION RATE: 15 BRPM | SYSTOLIC BLOOD PRESSURE: 118 MMHG | TEMPERATURE: 98.2 F | WEIGHT: 180 LBS | HEART RATE: 58 BPM

## 2025-05-11 DIAGNOSIS — F41.9 ANXIETY: ICD-10-CM

## 2025-05-11 PROCEDURE — 99283 EMERGENCY DEPT VISIT LOW MDM: CPT

## 2025-05-11 ASSESSMENT — PAIN DESCRIPTION - PAIN TYPE: TYPE: ACUTE PAIN

## 2025-05-11 ASSESSMENT — FIBROSIS 4 INDEX: FIB4 SCORE: 0.95

## 2025-05-11 NOTE — DISCHARGE INSTRUCTIONS
See your doctor if not feeling better in 1 week.  Continue to avoid smoking.  Please take your medications as directed

## 2025-05-11 NOTE — ED TRIAGE NOTES
"Chief Complaint   Patient presents with    Psych Eval     BIBA from home. Pt states she doesn't remember why she called EMS. She's continuously asking if she's in trouble. Denies any medical complaints at this time.       /79   Pulse 66   Temp 36.8 °C (98.2 °F) (Temporal)   Resp 17   Ht 1.575 m (5' 2\")   Wt 81.6 kg (180 lb)   SpO2 92%   BMI 32.92 kg/m²     "

## 2025-05-11 NOTE — ED PROVIDER NOTES
ED Provider Note    CHIEF COMPLAINT  Chief Complaint   Patient presents with    Psych Eval     BIBA from home. Pt states she doesn't remember why she called EMS. She's continuously asking if she's in trouble. Denies any medical complaints at this time.         EXTERNAL RECORDS REVIEWED  External ED Note seen at [facility] April 28 for anxiety.    HPI/ROS  LIMITATION TO HISTORY   Select: : None  OUTSIDE HISTORIAN(S):  EMS gave report on the patient's condition en route, chief complaint    Violeta Baez is a 64 y.o. female who presents stating she feels anxious.  She has been using patches to stop smoking, she states excessively in the last 2 days.  Patient states she has had questions regarding whether she is supposed to take pills to stop smoking or just use the patches.  She states her primary care physician has been directing this care.  No suicidal or homicidal ideation.  She denies intoxications.  No difficulty breathing.  No chest pain.  No abdominal pain.    PAST MEDICAL HISTORY   has a past medical history of Anxiety, Asthma, CHF (congestive heart failure) ([facility]), Hashimoto's disease, Hypertension, Hypothyroid, Methamphetamine abuse (HCC), Psychiatric disorder, TBI (traumatic brain injury) (HCC), and Thyroid cancer (HCC).    SURGICAL HISTORY   has a past surgical history that includes thyroidectomy; lumpectomy (Right); and tendon repair (Right).    FAMILY HISTORY  Family History   Problem Relation Age of Onset    No Known Problems Mother     Lung Cancer Father     No Known Problems Son     No Known Problems Son        SOCIAL HISTORY  Social History     Tobacco Use    Smoking status: Every Day     Current packs/day: 1.00     Average packs/day: 1 pack/day for 35.0 years (35.0 ttl pk-yrs)     Types: Cigarettes    Smokeless tobacco: Current   Vaping Use    Vaping status: Some Days    Substances: Nicotine   Substance and Sexual Activity    Alcohol use: Yes     Alcohol/week: 0.6 oz     Types: 1  "Shots of liquor per week     Comment: vodka    Drug use: Not Currently     Frequency: 2.0 times per week     Comment: marijauna, meth    Sexual activity: Yes       CURRENT MEDICATIONS  Home Medications       Reviewed by Sonja Cummins R.N. (Registered Nurse) on 05/11/25 at 1531  Med List Status: Partial     Medication Last Dose Status   albuterol 108 (90 Base) MCG/ACT Aero Soln inhalation aerosol  Active   carvedilol (COREG) 3.125 MG Tab  Active   ciclopirox (PENLAC) 8 % solution  Active   DULoxetine (CYMBALTA) 30 MG Cap DR Particles  Active   DULoxetine (CYMBALTA) 60 MG Cap DR Particles delayed-release capsule  Active   Empagliflozin (JARDIANCE) 10 MG Tab tablet  Active   hydrOXYzine HCl (ATARAX) 25 MG Tab  Active   levothyroxine (SYNTHROID) 150 MCG Tab  Active   lisinopril-hydrochlorothiazide (PRINZIDE) 20-25 MG per tablet  Active   Melatonin 3 MG Cap  Active   nicotine (NICODERM) 14 MG/24HR PATCH 24 HR  Active   nicotine (NICODERM) 14 MG/24HR PATCH 24 HR  Active   spironolactone (ALDACTONE) 25 MG Tab  Active   Varenicline Tartrate, Starter, 0.5 MG X 11 & 1 MG X 42 Tablet Therapy Pack  Active   Vitamin D, Ergocalciferol, 19404 units Cap  Active                    ALLERGIES  Allergies   Allergen Reactions    Fluoxetine      Irritability, insomnia       PHYSICAL EXAM  VITAL SIGNS: /79   Pulse 66   Temp 36.8 °C (98.2 °F) (Temporal)   Resp 17   Ht 1.575 m (5' 2\")   Wt 81.6 kg (180 lb)   SpO2 92%   BMI 32.92 kg/m²    Respiratory: Clear lung sounds with good air movement  Cardiac: Regular rate, regular rhythm  GI: Abdomen is nontender, no distention  Skin: No rash  Neurologic: Strength and sensation normal.  Speech clear  Psychiatric: Mildly anxious.  Cooperative.  Normal mood          COURSE & MEDICAL DECISION MAKING    ASSESSMENT, COURSE AND PLAN  Care Narrative: Review of this patient's chart shows high frequency of visits emergency department for anxiety, similar complaints.  Today she has " no acute medical complaint other than felt anxious.  I have advised her to carefully check the label on her medications to make sure she is taking them as prescribed.  As her primary care doctor has been directing her smoking cessation, she is advised to call their office tomorrow for any questions on the medication she has been prescribed.  Medical screening exam at this time is negative for acute emergent conditions, she is discharged well-appearing        DISPOSITION AND DISCUSSIONS    Escalation of care considered, and ultimately not performed:Laboratory analysis and diagnostic imaging      Decision tools and prescription drugs considered including, but not limited to: Medication modification no change in her current medications, to continue as prescribed .    FINAL DIAGNOSIS  1. Anxiety         Electronically signed by: Andrew Cruz M.D., 5/11/2025 3:45 PM

## 2025-05-11 NOTE — ED NOTES
Patient AAOx4. Discharge education/summary reviewed with patient, questions/concerns addressed with pt. Patient verbalized understanding of education provided. Pt ambulatory out to lobby with a steady gait, NADN. Pt discharged home to self care.     Pt given bus pass

## 2025-05-15 ENCOUNTER — APPOINTMENT (OUTPATIENT)
Dept: OBGYN | Facility: CLINIC | Age: 65
End: 2025-05-15
Attending: FAMILY MEDICINE
Payer: MEDICARE

## 2025-06-03 ENCOUNTER — APPOINTMENT (OUTPATIENT)
Dept: OBGYN | Facility: CLINIC | Age: 65
End: 2025-06-03
Attending: FAMILY MEDICINE
Payer: MEDICARE

## 2025-06-03 ENCOUNTER — HOSPITAL ENCOUNTER (OUTPATIENT)
Facility: MEDICAL CENTER | Age: 65
End: 2025-06-03
Payer: MEDICARE

## 2025-06-03 VITALS — SYSTOLIC BLOOD PRESSURE: 100 MMHG | DIASTOLIC BLOOD PRESSURE: 59 MMHG

## 2025-06-03 DIAGNOSIS — Z11.3 ROUTINE SCREENING FOR STI (SEXUALLY TRANSMITTED INFECTION): ICD-10-CM

## 2025-06-03 DIAGNOSIS — Z01.419 WELL WOMAN EXAM: ICD-10-CM

## 2025-06-03 DIAGNOSIS — Z12.31 ENCOUNTER FOR SCREENING MAMMOGRAM FOR BREAST CANCER: ICD-10-CM

## 2025-06-03 DIAGNOSIS — Z12.4 ROUTINE CERVICAL SMEAR: ICD-10-CM

## 2025-06-03 DIAGNOSIS — Z01.419 WELL WOMAN EXAM: Primary | ICD-10-CM

## 2025-06-03 LAB
CANDIDA DNA VAG QL PROBE+SIG AMP: NEGATIVE
G VAGINALIS DNA VAG QL PROBE+SIG AMP: POSITIVE
T VAGINALIS DNA VAG QL PROBE+SIG AMP: NEGATIVE

## 2025-06-03 PROCEDURE — 87510 GARDNER VAG DNA DIR PROBE: CPT

## 2025-06-03 PROCEDURE — 3078F DIAST BP <80 MM HG: CPT

## 2025-06-03 PROCEDURE — 3074F SYST BP LT 130 MM HG: CPT

## 2025-06-03 PROCEDURE — 87591 N.GONORRHOEAE DNA AMP PROB: CPT

## 2025-06-03 PROCEDURE — 87480 CANDIDA DNA DIR PROBE: CPT

## 2025-06-03 PROCEDURE — G0101 CA SCREEN;PELVIC/BREAST EXAM: HCPCS | Mod: 25

## 2025-06-03 PROCEDURE — 88142 CYTOPATH C/V THIN LAYER: CPT

## 2025-06-03 PROCEDURE — 87491 CHLMYD TRACH DNA AMP PROBE: CPT

## 2025-06-03 PROCEDURE — 87660 TRICHOMONAS VAGIN DIR PROBE: CPT

## 2025-06-03 PROCEDURE — 87624 HPV HI-RISK TYP POOLED RSLT: CPT
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
## 2025-06-03 NOTE — PROGRESS NOTES
Patient is here for annual well woman exam  Patient reports she went through menopause at age 50 - she is postmenopausal and denies any episodes of vaginal bleeding  Pt is unsure how long ago her last pap was, but states it was a long time ago. Pt also reports that she does have a history of abnormal pap smear  Last Pap/Results: unsure  Last Mammogram: 7/1/2024 - Negative  Pt aware she is due in July - will order mammo  Sexually Active: No - not currently - pt would like STI testing. Pt denies abnormal discharge/odor.  Birth Control Method: BTL with second delivery  Phone: [phone]  Pharmacy: [facility] - [address]  Allergies and all medications confirmed/updated

## 2025-06-04 ENCOUNTER — TELEPHONE (OUTPATIENT)
Dept: OBGYN | Facility: CLINIC | Age: 65
End: 2025-06-04
Payer: MEDICARE

## 2025-06-04 LAB
C TRACH DNA GENITAL QL NAA+PROBE: NEGATIVE
N GONORRHOEA DNA GENITAL QL NAA+PROBE: NEGATIVE
SPECIMEN SOURCE: NORMAL

## 2025-06-05 ENCOUNTER — RESULTS FOLLOW-UP (OUTPATIENT)
Dept: OBGYN | Facility: CLINIC | Age: 65
End: 2025-06-05
Payer: MEDICARE

## 2025-06-05 DIAGNOSIS — N76.0 BACTERIAL VAGINOSIS: Primary | ICD-10-CM

## 2025-06-05 DIAGNOSIS — B96.89 BACTERIAL VAGINOSIS: Primary | ICD-10-CM

## 2025-06-05 RX ORDER — METRONIDAZOLE 500 MG/1
500 TABLET ORAL 2 TIMES DAILY
Qty: 14 TABLET | Refills: 0 | Status: SHIPPED | OUTPATIENT
Start: 2025-06-05 | End: 2025-06-12

## 2025-06-07 ENCOUNTER — HOSPITAL ENCOUNTER (OUTPATIENT)
Dept: LAB | Facility: MEDICAL CENTER | Age: 65
End: 2025-06-07
Payer: MEDICARE

## 2025-06-07 DIAGNOSIS — Z11.3 ROUTINE SCREENING FOR STI (SEXUALLY TRANSMITTED INFECTION): ICD-10-CM

## 2025-06-07 DIAGNOSIS — Z01.419 WELL WOMAN EXAM: ICD-10-CM

## 2025-06-07 LAB
HAV IGM SERPL QL IA: NORMAL
HBV CORE IGM SER QL: NORMAL
HBV SURFACE AG SER QL: NORMAL
HCV AB SER QL: NORMAL
HIV 1+2 AB+HIV1 P24 AG SERPL QL IA: NORMAL
T PALLIDUM AB SER QL IA: NORMAL

## 2025-06-07 PROCEDURE — 86694 HERPES SIMPLEX NES ANTBDY: CPT

## 2025-06-07 PROCEDURE — 86780 TREPONEMA PALLIDUM: CPT | Mod: GA

## 2025-06-07 PROCEDURE — 86695 HERPES SIMPLEX TYPE 1 TEST: CPT

## 2025-06-07 PROCEDURE — G0475 HIV COMBINATION ASSAY: HCPCS | Mod: GA

## 2025-06-07 PROCEDURE — 80074 ACUTE HEPATITIS PANEL: CPT | Mod: GA

## 2025-06-07 PROCEDURE — 86696 HERPES SIMPLEX TYPE 2 TEST: CPT

## 2025-06-07 PROCEDURE — 36415 COLL VENOUS BLD VENIPUNCTURE: CPT

## 2025-06-10 ENCOUNTER — TELEPHONE (OUTPATIENT)
Dept: MEDICAL GROUP | Facility: PHYSICIAN GROUP | Age: 65
End: 2025-06-10
Payer: MEDICARE

## 2025-06-10 LAB
HSV1 GG IGG SER-ACNC: 0.51 IV
HSV1+2 IGG SER IA-ACNC: >22.4 IV
HSV2 GG IGG SER-ACNC: 15.2 IV

## 2025-06-11 NOTE — TELEPHONE ENCOUNTER
VOICEMAIL  1. Caller Name: Violeta Baez                        Call Back Number: [phone] (home)       2. Message: would like an appt to go over results     3. Patient approves office to leave a detailed voicemail/MyChart message: N\A      Phone Number Called: [phone] (home)       Call outcome: Left detailed message for patient. Informed to call back with any additional questions.    Message: Advised call  to schedule with the provider to go over results

## 2025-06-12 LAB
HPV I/H RISK 1 DNA SPEC QL NAA+PROBE: NOT DETECTED
SPECIMEN SOURCE: NORMAL
THINPREP PAP, CYTOLOGY NL11781: NORMAL

## 2025-06-21 VITALS
HEART RATE: 68 BPM | BODY MASS INDEX: 30.83 KG/M2 | WEIGHT: 167.55 LBS | RESPIRATION RATE: 16 BRPM | SYSTOLIC BLOOD PRESSURE: 152 MMHG | TEMPERATURE: 96.9 F | DIASTOLIC BLOOD PRESSURE: 93 MMHG | HEIGHT: 62 IN | OXYGEN SATURATION: 97 %

## 2025-06-21 PROCEDURE — 99283 EMERGENCY DEPT VISIT LOW MDM: CPT

## 2025-06-21 ASSESSMENT — FIBROSIS 4 INDEX: FIB4 SCORE: 0.95

## 2025-06-22 ENCOUNTER — PHARMACY VISIT (OUTPATIENT)
Dept: PHARMACY | Facility: MEDICAL CENTER | Age: 65
End: 2025-06-22
Payer: COMMERCIAL

## 2025-06-22 ENCOUNTER — HOSPITAL ENCOUNTER (EMERGENCY)
Facility: MEDICAL CENTER | Age: 65
End: 2025-06-22
Attending: EMERGENCY MEDICINE
Payer: MEDICARE

## 2025-06-22 DIAGNOSIS — K08.89 PAIN, DENTAL: ICD-10-CM

## 2025-06-22 DIAGNOSIS — F41.9 ANXIETY: Primary | ICD-10-CM

## 2025-06-22 PROCEDURE — 700102 HCHG RX REV CODE 250 W/ 637 OVERRIDE(OP): Performed by: EMERGENCY MEDICINE

## 2025-06-22 PROCEDURE — RXMED WILLOW AMBULATORY MEDICATION CHARGE: Performed by: EMERGENCY MEDICINE

## 2025-06-22 PROCEDURE — A9270 NON-COVERED ITEM OR SERVICE: HCPCS | Performed by: EMERGENCY MEDICINE

## 2025-06-22 RX ORDER — AMOXICILLIN 500 MG/1
500 CAPSULE ORAL ONCE
Status: COMPLETED | OUTPATIENT
Start: 2025-06-22 | End: 2025-06-22

## 2025-06-22 RX ORDER — LORAZEPAM 1 MG/1
1 TABLET ORAL ONCE
Status: COMPLETED | OUTPATIENT
Start: 2025-06-22 | End: 2025-06-22

## 2025-06-22 RX ORDER — AMOXICILLIN 500 MG/1
500 CAPSULE ORAL 3 TIMES DAILY
Qty: 21 CAPSULE | Refills: 0 | Status: ACTIVE | OUTPATIENT
Start: 2025-06-22 | End: 2025-06-29

## 2025-06-22 RX ADMIN — AMOXICILLIN 500 MG: 500 CAPSULE ORAL at 01:57

## 2025-06-22 RX ADMIN — LORAZEPAM 1 MG: 1 TABLET ORAL at 01:57
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
## 2025-06-22 NOTE — ED NOTES
Pt provided d/c instructions and verbalizes understanding with no further questions. Rx sent to pt's requested pharmacy. Home care instructions explained. Pt ambulatory to ED dave

## 2025-06-22 NOTE — ED TRIAGE NOTES
"Patient to ED for complaints of \"My body hurts\" and \"I think my tooth hurts.\" States it started today. She restless and tangential in her speech. Denies any cold or flu symptoms. She states she does feel better after taking some tylenol at home. BIB EMS.  "

## 2025-07-27 DIAGNOSIS — F17.218 CIGARETTE NICOTINE DEPENDENCE WITH OTHER NICOTINE-INDUCED DISORDER: ICD-10-CM

## 2025-07-28 RX ORDER — NICOTINE 21 MG/24HR
PATCH, TRANSDERMAL 24 HOURS TRANSDERMAL
Qty: 84 PATCH | Refills: 0 | Status: SHIPPED | OUTPATIENT
Start: 2025-07-28

## 2025-07-28 NOTE — TELEPHONE ENCOUNTER
Received request via: Pharmacy    Was the patient seen in the last year in this department? Yes    Does the patient have an active prescription (recently filled or refills available) for medication(s) requested? No    Pharmacy Name: [facility]    Does the patient have halfway Plus and need 100-day supply? (This applies to ALL medications) Patient does not have SCP

## 2025-08-05 ENCOUNTER — TELEPHONE (OUTPATIENT)
Dept: MEDICAL GROUP | Facility: PHYSICIAN GROUP | Age: 65
End: 2025-08-05
Payer: MEDICARE

## 2025-08-10 ENCOUNTER — HOSPITAL ENCOUNTER (EMERGENCY)
Facility: MEDICAL CENTER | Age: 65
End: 2025-08-10
Attending: EMERGENCY MEDICINE
Payer: MEDICARE

## 2025-08-10 ENCOUNTER — APPOINTMENT (OUTPATIENT)
Dept: RADIOLOGY | Facility: MEDICAL CENTER | Age: 65
End: 2025-08-10
Attending: EMERGENCY MEDICINE
Payer: MEDICARE

## 2025-08-10 VITALS
RESPIRATION RATE: 16 BRPM | TEMPERATURE: 97.3 F | DIASTOLIC BLOOD PRESSURE: 66 MMHG | OXYGEN SATURATION: 93 % | SYSTOLIC BLOOD PRESSURE: 117 MMHG | WEIGHT: 179.01 LBS | BODY MASS INDEX: 32.94 KG/M2 | HEIGHT: 62 IN | HEART RATE: 53 BPM

## 2025-08-10 DIAGNOSIS — F41.9 ANXIETY: Primary | ICD-10-CM

## 2025-08-10 DIAGNOSIS — R06.00 DYSPNEA, UNSPECIFIED TYPE: ICD-10-CM

## 2025-08-10 LAB
ALBUMIN SERPL BCP-MCNC: 3.9 G/DL (ref 3.2–4.9)
ALBUMIN/GLOB SERPL: 1.1 G/DL
ALP SERPL-CCNC: 72 U/L (ref 30–99)
ALT SERPL-CCNC: 13 U/L (ref 2–50)
ANION GAP SERPL CALC-SCNC: 12 MMOL/L (ref 7–16)
AST SERPL-CCNC: 17 U/L (ref 12–45)
BASOPHILS # BLD AUTO: 0.5 % (ref 0–1.8)
BASOPHILS # BLD: 0.04 K/UL (ref 0–0.12)
BILIRUB SERPL-MCNC: 1.3 MG/DL (ref 0.1–1.5)
BUN SERPL-MCNC: 27 MG/DL (ref 8–22)
CALCIUM ALBUM COR SERPL-MCNC: 9.6 MG/DL (ref 8.5–10.5)
CALCIUM SERPL-MCNC: 9.5 MG/DL (ref 8.5–10.5)
CHLORIDE SERPL-SCNC: 103 MMOL/L (ref 96–112)
CO2 SERPL-SCNC: 21 MMOL/L (ref 20–33)
CREAT SERPL-MCNC: 1.16 MG/DL (ref 0.5–1.4)
EKG IMPRESSION: NORMAL
EOSINOPHIL # BLD AUTO: 0.18 K/UL (ref 0–0.51)
EOSINOPHIL NFR BLD: 2.2 % (ref 0–6.9)
ERYTHROCYTE [DISTWIDTH] IN BLOOD BY AUTOMATED COUNT: 46.1 FL (ref 35.9–50)
GFR SERPLBLD CREATININE-BSD FMLA CKD-EPI: 52 ML/MIN/1.73 M 2
GLOBULIN SER CALC-MCNC: 3.4 G/DL (ref 1.9–3.5)
GLUCOSE SERPL-MCNC: 117 MG/DL (ref 65–99)
HCT VFR BLD AUTO: 44.3 % (ref 37–47)
HGB BLD-MCNC: 14.6 G/DL (ref 12–16)
IMM GRANULOCYTES # BLD AUTO: 0.02 K/UL (ref 0–0.11)
IMM GRANULOCYTES NFR BLD AUTO: 0.2 % (ref 0–0.9)
LYMPHOCYTES # BLD AUTO: 2.51 K/UL (ref 1–4.8)
LYMPHOCYTES NFR BLD: 30.9 % (ref 22–41)
MCH RBC QN AUTO: 30 PG (ref 27–33)
MCHC RBC AUTO-ENTMCNC: 33 G/DL (ref 32.2–35.5)
MCV RBC AUTO: 91 FL (ref 81.4–97.8)
MONOCYTES # BLD AUTO: 0.46 K/UL (ref 0–0.85)
MONOCYTES NFR BLD AUTO: 5.7 % (ref 0–13.4)
NEUTROPHILS # BLD AUTO: 4.9 K/UL (ref 1.82–7.42)
NEUTROPHILS NFR BLD: 60.5 % (ref 44–72)
NRBC # BLD AUTO: 0 K/UL
NRBC BLD-RTO: 0 /100 WBC (ref 0–0.2)
NT-PROBNP SERPL IA-MCNC: 86 PG/ML (ref 0–125)
PLATELET # BLD AUTO: 255 K/UL (ref 164–446)
PMV BLD AUTO: 10.6 FL (ref 9–12.9)
POTASSIUM SERPL-SCNC: 4.3 MMOL/L (ref 3.6–5.5)
PROT SERPL-MCNC: 7.3 G/DL (ref 6–8.2)
RBC # BLD AUTO: 4.87 M/UL (ref 4.2–5.4)
SODIUM SERPL-SCNC: 136 MMOL/L (ref 135–145)
TROPONIN T SERPL-MCNC: 10 NG/L (ref 6–19)
WBC # BLD AUTO: 8.1 K/UL (ref 4.8–10.8)

## 2025-08-10 PROCEDURE — 93005 ELECTROCARDIOGRAM TRACING: CPT | Mod: TC | Performed by: EMERGENCY MEDICINE

## 2025-08-10 PROCEDURE — 99284 EMERGENCY DEPT VISIT MOD MDM: CPT

## 2025-08-10 PROCEDURE — 93005 ELECTROCARDIOGRAM TRACING: CPT | Mod: TC

## 2025-08-10 PROCEDURE — 83880 ASSAY OF NATRIURETIC PEPTIDE: CPT

## 2025-08-10 PROCEDURE — 85025 COMPLETE CBC W/AUTO DIFF WBC: CPT

## 2025-08-10 PROCEDURE — 84484 ASSAY OF TROPONIN QUANT: CPT

## 2025-08-10 PROCEDURE — 80053 COMPREHEN METABOLIC PANEL: CPT

## 2025-08-10 PROCEDURE — 71045 X-RAY EXAM CHEST 1 VIEW: CPT

## 2025-08-10 PROCEDURE — 36415 COLL VENOUS BLD VENIPUNCTURE: CPT

## 2025-08-10 ASSESSMENT — FIBROSIS 4 INDEX: FIB4 SCORE: 1.03

## 2025-08-20 ENCOUNTER — APPOINTMENT (OUTPATIENT)
Dept: MEDICAL GROUP | Facility: PHYSICIAN GROUP | Age: 65
End: 2025-08-20
Payer: MEDICARE

## 2025-08-20 PROBLEM — F29 PSYCHOSIS (HCC): Status: ACTIVE | Noted: 2025-07-08

## 2025-08-20 PROBLEM — B35.1 ONYCHOMYCOSIS: Status: ACTIVE | Noted: 2025-08-20

## 2025-08-20 PROBLEM — M25.70: Status: ACTIVE | Noted: 2025-08-20

## 2025-08-20 PROBLEM — Z98.890 HISTORY OF FOOT SURGERY: Status: ACTIVE | Noted: 2025-08-20

## 2025-08-20 ASSESSMENT — FIBROSIS 4 INDEX: FIB4 SCORE: 1.201850425154663098
